# Patient Record
Sex: FEMALE | Race: WHITE | NOT HISPANIC OR LATINO | Employment: OTHER | URBAN - NONMETROPOLITAN AREA
[De-identification: names, ages, dates, MRNs, and addresses within clinical notes are randomized per-mention and may not be internally consistent; named-entity substitution may affect disease eponyms.]

---

## 2020-07-17 ENCOUNTER — APPOINTMENT (EMERGENCY)
Dept: RADIOLOGY | Facility: HOSPITAL | Age: 75
End: 2020-07-17
Payer: MEDICARE

## 2020-07-17 ENCOUNTER — HOSPITAL ENCOUNTER (OUTPATIENT)
Facility: HOSPITAL | Age: 75
Setting detail: OBSERVATION
Discharge: HOME | End: 2020-07-18
Attending: EMERGENCY MEDICINE | Admitting: FAMILY MEDICINE
Payer: MEDICARE

## 2020-07-17 DIAGNOSIS — R07.9 CHEST PAIN, UNSPECIFIED TYPE: Primary | ICD-10-CM

## 2020-07-17 PROBLEM — E78.5 HYPERLIPIDEMIA: Status: ACTIVE | Noted: 2020-07-17

## 2020-07-17 PROBLEM — R73.9 HYPERGLYCEMIA: Status: ACTIVE | Noted: 2020-07-17

## 2020-07-17 PROBLEM — D72.829 LEUKOCYTOSIS: Status: ACTIVE | Noted: 2020-07-17

## 2020-07-17 PROBLEM — E03.9 ACQUIRED HYPOTHYROIDISM: Status: ACTIVE | Noted: 2020-07-17

## 2020-07-17 LAB
ALBUMIN SERPL BCP-MCNC: 3.7 G/DL (ref 3.5–5)
ALP SERPL-CCNC: 90 U/L (ref 46–116)
ALT SERPL W P-5'-P-CCNC: 34 U/L (ref 12–78)
ANION GAP SERPL CALCULATED.3IONS-SCNC: 8 MMOL/L (ref 4–13)
AST SERPL W P-5'-P-CCNC: 23 U/L (ref 5–45)
BASOPHILS # BLD AUTO: 0.03 THOUSANDS/ΜL (ref 0–0.1)
BASOPHILS NFR BLD AUTO: 0 % (ref 0–1)
BILIRUB SERPL-MCNC: 0.54 MG/DL (ref 0.2–1)
BUN SERPL-MCNC: 23 MG/DL (ref 5–25)
CALCIUM SERPL-MCNC: 9.6 MG/DL (ref 8.3–10.1)
CHLORIDE SERPL-SCNC: 105 MMOL/L (ref 100–108)
CO2 SERPL-SCNC: 26 MMOL/L (ref 21–32)
CREAT SERPL-MCNC: 0.95 MG/DL (ref 0.6–1.3)
EOSINOPHIL # BLD AUTO: 0.02 THOUSAND/ΜL (ref 0–0.61)
EOSINOPHIL NFR BLD AUTO: 0 % (ref 0–6)
ERYTHROCYTE [DISTWIDTH] IN BLOOD BY AUTOMATED COUNT: 13.9 % (ref 11.6–15.1)
GFR SERPL CREATININE-BSD FRML MDRD: 60 ML/MIN/1.73SQ M
GLUCOSE SERPL-MCNC: 144 MG/DL (ref 65–140)
HCT VFR BLD AUTO: 42.8 % (ref 34.8–46.1)
HGB BLD-MCNC: 13.9 G/DL (ref 11.5–15.4)
IMM GRANULOCYTES # BLD AUTO: 0.14 THOUSAND/UL (ref 0–0.2)
IMM GRANULOCYTES NFR BLD AUTO: 1 % (ref 0–2)
LYMPHOCYTES # BLD AUTO: 1.77 THOUSANDS/ΜL (ref 0.6–4.47)
LYMPHOCYTES NFR BLD AUTO: 12 % (ref 14–44)
MCH RBC QN AUTO: 29.5 PG (ref 26.8–34.3)
MCHC RBC AUTO-ENTMCNC: 32.5 G/DL (ref 31.4–37.4)
MCV RBC AUTO: 91 FL (ref 82–98)
MONOCYTES # BLD AUTO: 0.83 THOUSAND/ΜL (ref 0.17–1.22)
MONOCYTES NFR BLD AUTO: 5 % (ref 4–12)
NEUTROPHILS # BLD AUTO: 12.54 THOUSANDS/ΜL (ref 1.85–7.62)
NEUTS SEG NFR BLD AUTO: 82 % (ref 43–75)
NRBC BLD AUTO-RTO: 0 /100 WBCS
NT-PROBNP SERPL-MCNC: 73 PG/ML
PLATELET # BLD AUTO: 249 THOUSANDS/UL (ref 149–390)
PMV BLD AUTO: 10.2 FL (ref 8.9–12.7)
POTASSIUM SERPL-SCNC: 3.9 MMOL/L (ref 3.5–5.3)
PROT SERPL-MCNC: 7.2 G/DL (ref 6.4–8.2)
RBC # BLD AUTO: 4.71 MILLION/UL (ref 3.81–5.12)
SODIUM SERPL-SCNC: 139 MMOL/L (ref 136–145)
TROPONIN I SERPL-MCNC: 0.03 NG/ML
TROPONIN I SERPL-MCNC: <0.02 NG/ML
WBC # BLD AUTO: 15.33 THOUSAND/UL (ref 4.31–10.16)

## 2020-07-17 PROCEDURE — 99285 EMERGENCY DEPT VISIT HI MDM: CPT

## 2020-07-17 PROCEDURE — 83880 ASSAY OF NATRIURETIC PEPTIDE: CPT | Performed by: EMERGENCY MEDICINE

## 2020-07-17 PROCEDURE — 99220 PR INITIAL OBSERVATION CARE/DAY 70 MINUTES: CPT | Performed by: NURSE PRACTITIONER

## 2020-07-17 PROCEDURE — 80053 COMPREHEN METABOLIC PANEL: CPT | Performed by: EMERGENCY MEDICINE

## 2020-07-17 PROCEDURE — 36415 COLL VENOUS BLD VENIPUNCTURE: CPT | Performed by: EMERGENCY MEDICINE

## 2020-07-17 PROCEDURE — 84484 ASSAY OF TROPONIN QUANT: CPT | Performed by: EMERGENCY MEDICINE

## 2020-07-17 PROCEDURE — 99285 EMERGENCY DEPT VISIT HI MDM: CPT | Performed by: EMERGENCY MEDICINE

## 2020-07-17 PROCEDURE — 84484 ASSAY OF TROPONIN QUANT: CPT | Performed by: NURSE PRACTITIONER

## 2020-07-17 PROCEDURE — 85025 COMPLETE CBC W/AUTO DIFF WBC: CPT | Performed by: EMERGENCY MEDICINE

## 2020-07-17 PROCEDURE — 93005 ELECTROCARDIOGRAM TRACING: CPT

## 2020-07-17 PROCEDURE — 71045 X-RAY EXAM CHEST 1 VIEW: CPT

## 2020-07-17 RX ORDER — MINOXIDIL 2 %
SOLUTION, NON-ORAL TOPICAL
COMMUNITY
Start: 2017-10-10

## 2020-07-17 RX ORDER — SIMVASTATIN 40 MG
TABLET ORAL
COMMUNITY
Start: 2011-12-28

## 2020-07-17 RX ORDER — LEVOTHYROXINE SODIUM 0.07 MG/1
75 TABLET ORAL
Status: DISCONTINUED | OUTPATIENT
Start: 2020-07-18 | End: 2020-07-18 | Stop reason: HOSPADM

## 2020-07-17 RX ORDER — CHOLECALCIFEROL (VITAMIN D3) 125 MCG
CAPSULE ORAL EVERY OTHER DAY
COMMUNITY

## 2020-07-17 RX ORDER — UBIDECARENONE 200 MG
CAPSULE ORAL DAILY
COMMUNITY
Start: 2019-11-15

## 2020-07-17 RX ORDER — ALPRAZOLAM 0.25 MG/1
0.25 TABLET ORAL
Status: DISCONTINUED | OUTPATIENT
Start: 2020-07-17 | End: 2020-07-18 | Stop reason: HOSPADM

## 2020-07-17 RX ORDER — ACETAMINOPHEN 325 MG/1
650 TABLET ORAL EVERY 4 HOURS PRN
Status: DISCONTINUED | OUTPATIENT
Start: 2020-07-17 | End: 2020-07-18 | Stop reason: HOSPADM

## 2020-07-17 RX ORDER — ASPIRIN 81 MG/1
81 TABLET ORAL DAILY
Status: DISCONTINUED | OUTPATIENT
Start: 2020-07-18 | End: 2020-07-18 | Stop reason: HOSPADM

## 2020-07-17 RX ORDER — PRAVASTATIN SODIUM 80 MG/1
80 TABLET ORAL
Status: DISCONTINUED | OUTPATIENT
Start: 2020-07-18 | End: 2020-07-18 | Stop reason: HOSPADM

## 2020-07-17 RX ORDER — LEVOTHYROXINE SODIUM 0.07 MG/1
TABLET ORAL
COMMUNITY
Start: 2011-12-28

## 2020-07-17 RX ORDER — DOXYCYCLINE HYCLATE 20 MG
20 TABLET ORAL 2 TIMES DAILY
COMMUNITY
Start: 2020-01-07 | End: 2020-07-18 | Stop reason: HOSPADM

## 2020-07-17 RX ORDER — ALPRAZOLAM 0.25 MG/1
TABLET ORAL
COMMUNITY
Start: 2011-12-28

## 2020-07-17 NOTE — ED PROVIDER NOTES
History  Chief Complaint   Patient presents with    Chest Pain     midsternal chest pain that started 1 hour prior to arrival with nausea and HA   denies radiation of pain   given 324 ASA and 3 SL nitro PH with relief of pain  Patient complains of onset of crushing substernal chest pain approximately 45 minutes prior to ER arrival   She had no significant radiation of pain and no significant nausea or vomiting  She reported headache  EMS was called to her home, they provided the patient with 324 mg of aspirin and 3 sublingual nitroglycerin which improved her pain from 10/10 to 3/10 upon her arrival here  She continues to deny shortness of breath or nausea or vomiting  No other complaints at this time  Onset of pain was at rest       History provided by:  Patient   used: No    Chest Pain   Pain location:  Substernal area  Pain quality: crushing    Pain radiates to:  Does not radiate  Pain radiates to the back: no    Pain severity:  Severe  Onset quality:  Gradual  Duration:  45 minutes  Timing:  Constant  Progression:  Improving  Chronicity:  New  Relieved by:  Nitroglycerin and aspirin  Worsened by:  Nothing tried  Ineffective treatments:  None tried  Associated symptoms: no abdominal pain, no back pain, no claudication, no cough, no diaphoresis, no dizziness, no dysphagia, no fever, no headache, no nausea, no near-syncope, no numbness, no palpitations, no PND, no shortness of breath, no syncope and not vomiting        Prior to Admission Medications   Prescriptions Last Dose Informant Patient Reported? Taking?    ALPRAZolam (XANAX) 0 25 mg tablet   Yes Yes   Sig: Take by mouth   Cholecalciferol (VITAMIN D3) 50 MCG (2000 UT) TABS   Yes No   Sig: Take by mouth   Coenzyme Q10 200 MG capsule   Yes Yes   Sig: Take by mouth   Cyanocobalamin 1000 MCG SUBL   Yes Yes   Sig: Place 1,000 mcg under the tongue   Multiple Vitamin (MULTI-VITAMIN) tablet   Yes No   Sig: Take by mouth   Omega-3 Fatty Acids ([employer] FISH OIL) 1200 MG CAPS   Yes Yes   Sig: Take by mouth   Turmeric Curcumin 500 MG CAPS   Yes No   Sig: Take by mouth   doxycycline (PERIOSTAT) 20 MG tablet   Yes Yes   Sig: Take 20 mg by mouth   levothyroxine (Synthroid) 75 mcg tablet   Yes Yes   Sig: Take by mouth   simvastatin (ZOCOR) 40 mg tablet   Yes Yes   Sig: Take by mouth      Facility-Administered Medications: None       History reviewed  No pertinent past medical history  History reviewed  No pertinent surgical history  History reviewed  No pertinent family history  I have reviewed and agree with the history as documented  E-Cigarette/Vaping    E-Cigarette Use Never User      E-Cigarette/Vaping Substances     Social History     Tobacco Use    Smoking status: Never Smoker    Smokeless tobacco: Never Used   Substance Use Topics    Alcohol use: Yes     Frequency: 4 or more times a week    Drug use: Not Currently       Review of Systems   Constitutional: Negative for chills, diaphoresis and fever  HENT: Negative for ear pain, hearing loss, sore throat, trouble swallowing and voice change  Eyes: Negative for pain and discharge  Respiratory: Negative for cough, shortness of breath and wheezing  Cardiovascular: Positive for chest pain  Negative for palpitations, claudication, syncope, PND and near-syncope  Gastrointestinal: Negative for abdominal pain, blood in stool, constipation, diarrhea, nausea and vomiting  Genitourinary: Negative for dysuria, flank pain, frequency and hematuria  Musculoskeletal: Negative for back pain, joint swelling, neck pain and neck stiffness  Skin: Negative for rash and wound  Neurological: Negative for dizziness, seizures, syncope, facial asymmetry, numbness and headaches  Psychiatric/Behavioral: Negative for hallucinations, self-injury and suicidal ideas  All other systems reviewed and are negative        Physical Exam  Physical Exam   Constitutional: She is oriented to person, place, and time  She appears well-developed and well-nourished  No distress  HENT:   Head: Normocephalic and atraumatic  Right Ear: External ear normal    Left Ear: External ear normal    Eyes: Pupils are equal, round, and reactive to light  Conjunctivae and EOM are normal    Neck: Normal range of motion  Neck supple  Cardiovascular: Normal rate, regular rhythm and normal heart sounds  No murmur heard  Pulmonary/Chest: Effort normal and breath sounds normal  She has no wheezes  She has no rales  Abdominal: Soft  Bowel sounds are normal  She exhibits no distension  There is no tenderness  There is no rebound and no guarding  Musculoskeletal: Normal range of motion  She exhibits no deformity  Neurological: She is alert and oriented to person, place, and time  No cranial nerve deficit  Skin: Skin is warm and dry  No rash noted  Psychiatric: She has a normal mood and affect  Her behavior is normal    Nursing note and vitals reviewed        Vital Signs  ED Triage Vitals   Temperature Pulse Respirations Blood Pressure SpO2   07/17/20 1942 07/17/20 1942 07/17/20 1942 07/17/20 1942 07/17/20 1942   97 8 °F (36 6 °C) 89 15 128/70 95 %      Temp Source Heart Rate Source Patient Position - Orthostatic VS BP Location FiO2 (%)   07/17/20 1942 07/17/20 2000 07/17/20 1942 07/17/20 1942 --   Temporal Monitor Lying Right arm       Pain Score       07/17/20 1942       No Pain           Vitals:    07/17/20 1942 07/17/20 2000   BP: 128/70 125/77   Pulse: 89 67   Patient Position - Orthostatic VS: Lying Sitting         Visual Acuity      ED Medications  Medications - No data to display    Diagnostic Studies  Results Reviewed     Procedure Component Value Units Date/Time    NT-BNP PRO [[id number]]  (Normal) Collected:  07/17/20 1946    Lab Status:  Final result Specimen:  Blood from Arm, Left Updated:  07/17/20 2020     NT-proBNP 73 pg/mL     Comprehensive metabolic panel [[id number]]  (Abnormal) Collected:  07/17/20 1946    Lab Status:  Final result Specimen:  Blood from Arm, Left Updated:  07/17/20 2017     Sodium 139 mmol/L      Potassium 3 9 mmol/L      Chloride 105 mmol/L      CO2 26 mmol/L      ANION GAP 8 mmol/L      BUN 23 mg/dL      Creatinine 0 95 mg/dL      Glucose 144 mg/dL      Calcium 9 6 mg/dL      AST 23 U/L      ALT 34 U/L      Alkaline Phosphatase 90 U/L      Total Protein 7 2 g/dL      Albumin 3 7 g/dL      Total Bilirubin 0 54 mg/dL      eGFR 60 ml/min/1 73sq m     Narrative:       National Kidney Disease Foundation guidelines for Chronic Kidney Disease (CKD):     Stage 1 with normal or high GFR (GFR > 90 mL/min/1 73 square meters)    Stage 2 Mild CKD (GFR = 60-89 mL/min/1 73 square meters)    Stage 3A Moderate CKD (GFR = 45-59 mL/min/1 73 square meters)    Stage 3B Moderate CKD (GFR = 30-44 mL/min/1 73 square meters)    Stage 4 Severe CKD (GFR = 15-29 mL/min/1 73 square meters)    Stage 5 End Stage CKD (GFR <15 mL/min/1 73 square meters)  Note: GFR calculation is accurate only with a steady state creatinine    Troponin I [[id number]]  (Normal) Collected:  07/17/20 1946    Lab Status:  Final result Specimen:  Blood from Arm, Left Updated:  07/17/20 2017     Troponin I <0 02 ng/mL     CBC and differential [[id number]]  (Abnormal) Collected:  07/17/20 1946    Lab Status:  Final result Specimen:  Blood from Arm, Left Updated:  07/17/20 1953     WBC 15 33 Thousand/uL      RBC 4 71 Million/uL      Hemoglobin 13 9 g/dL      Hematocrit 42 8 %      MCV 91 fL      MCH 29 5 pg      MCHC 32 5 g/dL      RDW 13 9 %      MPV 10 2 fL      Platelets 718 Thousands/uL      nRBC 0 /100 WBCs      Neutrophils Relative 82 %      Immat GRANS % 1 %      Lymphocytes Relative 12 %      Monocytes Relative 5 %      Eosinophils Relative 0 %      Basophils Relative 0 %      Neutrophils Absolute 12 54 Thousands/µL      Immature Grans Absolute 0 14 Thousand/uL      Lymphocytes Absolute 1 77 Thousands/µL      Monocytes Absolute 0 83 Thousand/µL Eosinophils Absolute 0 02 Thousand/µL      Basophils Absolute 0 03 Thousands/µL                  XR chest portable   ED Interpretation by Cody Torres MD (07/17 2041)   No acute disease                 Procedures  ECG 12 Lead Documentation Only  Date/Time: 7/17/2020 7:38 PM  Performed by: Cody Torres MD  Authorized by: Cody Torres MD     Indications / Diagnosis:  Cp  ECG reviewed by me, the ED Provider: yes    Patient location:  ED  Previous ECG:     Previous ECG:  Unavailable  Interpretation:     Interpretation: abnormal    Rate:     ECG rate:  82    ECG rate assessment: normal    Rhythm:     Rhythm: sinus rhythm    Ectopy:     Ectopy: none    QRS:     QRS axis:  Normal    QRS intervals: Wide  Conduction:     Conduction: abnormal      Abnormal conduction: complete RBBB    ST segments:     ST segments:  Normal  T waves:     T waves: normal               ED Course       US AUDIT      Most Recent Value   Initial Alcohol Screen: US AUDIT-C    1  How often do you have a drink containing alcohol?  0 Filed at: 07/17/2020 1946   2  How many drinks containing alcohol do you have on a typical day you are drinking? 0 Filed at: 07/17/2020 1946   3a  Male UNDER 65: How often do you have five or more drinks on one occasion? 0 Filed at: 07/17/2020 1946   3b  FEMALE Any Age, or MALE 65+: How often do you have 4 or more drinks on one occassion? 0 Filed at: 07/17/2020 1946   Audit-C Score  0 Filed at: 07/17/2020 1946            HEART Risk Score      Most Recent Value   Heart Score Risk Calculator   History  2 Filed at: 07/17/2020 2020   ECG  1 Filed at: 07/17/2020 2020   Age  2 Filed at: 07/17/2020 2020   Risk Factors  1 Filed at: 07/17/2020 2020   Troponin  0 Filed at: 07/17/2020 2020   HEART Score  6 Filed at: 07/17/2020 2020            SUNNY/DAST-10      Most Recent Value   How many times in the past year have you    Used an illegal drug or used a prescription medication for non-medical reasons?   Never Filed at: 07/17/2020 1946                                [facility]  Number of Diagnoses or Management Options     Amount and/or Complexity of Data Reviewed  Clinical lab tests: ordered and reviewed  Tests in the radiology section of CPT®: ordered and reviewed  Decide to obtain previous medical records or to obtain history from someone other than the patient: yes  Review and summarize past medical records: yes  Independent visualization of images, tracings, or specimens: yes          Disposition  Final diagnoses:   Chest pain, unspecified type     Time reflects when diagnosis was documented in both MDM as applicable and the Disposition within this note     Time User Action Codes Description Comment    7/17/2020  8:39 PM Lg Shown Add [R07 9] Chest pain, unspecified type       ED Disposition     ED Disposition Condition Date/Time Comment    Admit Stable Fri Jul 17, 2020  8:39 PM Case was discussed with Bola Harvey and the patient's admission status was agreed to be Admission Status: observation status to the service of Dr David Kim   Follow-up Information    None         Patient's Medications   Discharge Prescriptions    No medications on file     No discharge procedures on file      PDMP Review     None          ED Provider  Electronically Signed by           Renee Rios MD  07/17/20 2730

## 2020-07-18 VITALS
RESPIRATION RATE: 19 BRPM | WEIGHT: 204.59 LBS | HEART RATE: 70 BPM | TEMPERATURE: 99.8 F | SYSTOLIC BLOOD PRESSURE: 122 MMHG | OXYGEN SATURATION: 95 % | DIASTOLIC BLOOD PRESSURE: 69 MMHG

## 2020-07-18 LAB
CHOLEST SERPL-MCNC: 139 MG/DL (ref 50–200)
HDLC SERPL-MCNC: 54 MG/DL
LDLC SERPL CALC-MCNC: 58 MG/DL (ref 0–100)
TRIGL SERPL-MCNC: 135 MG/DL
TROPONIN I SERPL-MCNC: 0.02 NG/ML

## 2020-07-18 PROCEDURE — 84484 ASSAY OF TROPONIN QUANT: CPT | Performed by: NURSE PRACTITIONER

## 2020-07-18 PROCEDURE — 80061 LIPID PANEL: CPT | Performed by: NURSE PRACTITIONER

## 2020-07-18 PROCEDURE — 99217 PR OBSERVATION CARE DISCHARGE MANAGEMENT: CPT | Performed by: INTERNAL MEDICINE

## 2020-07-18 RX ADMIN — ASPIRIN 81 MG: 81 TABLET, COATED ORAL at 08:38

## 2020-07-18 RX ADMIN — LEVOTHYROXINE SODIUM 75 MCG: 75 TABLET ORAL at 05:17

## 2020-07-18 NOTE — ASSESSMENT & PLAN NOTE
· Glucose 144  · Last H A1c 2/2020:  6 0  · Has outpatient blood work every 6 months  · Continue outpatient follow-up

## 2020-07-18 NOTE — ED NOTES
Patient saying goodbye to family  Patient reports no chest pain, reviewed transport with PCA        Geeta Rogers RN  07/17/20 2052

## 2020-07-18 NOTE — H&P
[facility] Internal Medicine     H&P- [address] [facility] 1945, 70 y o  female MRN: [id number]    Unit/Bed#: -Jason Encounter: [id number]    Primary Care Provider: No primary care provider on file  Date and time admitted to hospital: 7/17/2020  7:37 PM        * Chest pain in adult  Assessment & Plan  · Sudden onset of severe chest pain associated with nausea and headache which resolved with 3 nitroglycerin  · CASH score:  2  · EKG:  NSR rate of 82 with no evidence of acute infarct  · PCXR:  No acute cardiopulmonary disease  · Troponin: < 0 02  · Patient says she had stress test about 2 years ago in [place]  Per record review, had stress echo in 2014, not able to find additional stress testing in available records  · Serial troponins and EKGs  · Aspirin  · Continue statin  · Cardiac diet  · Consider inpatient stress testing    Leukocytosis  Assessment & Plan  · WBC:  15 33  · Could be reactive  · Afebrile  · Recheck CBC and trend WBC    Hyperglycemia  Assessment & Plan  · Glucose 144  · Last H A1c 2/2020:  6 0  · Has outpatient blood work every 6 months  · Continue outpatient follow-up    Hyperlipidemia  Assessment & Plan  · Check lipid panel  · Continue statin  · Outpatient follow-up    Acquired hypothyroidism  Assessment & Plan  · Last TSH 2/2020: 0 95   · Continue levothyroxine  · Outpatient follow-up      VTE Prophylaxis: Enoxaparin (Lovenox)  / reason for no mechanical VTE prophylaxis VTE score 3   Code Status:  Full  POLST: POLST form is not discussed and not completed at this time  Discussion with family:  Patient    Anticipated Length of Stay:  Patient will be admitted on an Observation basis with an anticipated length of stay of  less than 2 midnights  Justification for Hospital Stay:  Per plan above    Total Time for Visit, including Counseling / Coordination of Care: 30 minutes    Greater than 50% of this total time spent on direct patient counseling and coordination of care     Chief Complaint:   Chest pain    History of Present Illness:    Lenin Cid is a 70 y o  female with history of acquired hypothyroidism, hyperlipidemia, and hyperglycemia who presents with non-radiating chest pain  The pain started suddenly and was described as severe and crushing  She also had a headache and nausea  She received 3 nitroglycerin pre-hospital which resolved her symptoms  She denies fever or chills, cough, palpitations, abdominal pain, arthralgias or myalgias, dizziness, syncope, or focal weakness  Review of Systems:    Review of Systems   Constitutional: Negative for chills and fever  Respiratory: Negative for cough and shortness of breath  Cardiovascular: Positive for chest pain  Negative for palpitations and leg swelling  Gastrointestinal: Positive for nausea  Negative for abdominal distention, abdominal pain, constipation, diarrhea and vomiting  Genitourinary: Negative for dysuria and frequency  Musculoskeletal: Negative for arthralgias and myalgias  Neurological: Positive for headaches  Negative for dizziness, syncope and weakness  All other systems reviewed and are negative  Past Medical and Surgical History:     Past Medical History:   Diagnosis Date    Anxiety     Disease of thyroid gland     Hypercholesterolemia     Rosacea        Past Surgical History:   Procedure Laterality Date    HYSTERECTOMY         Meds/Allergies:    Prior to Admission medications    Medication Sig Start Date End Date Taking?  Authorizing Provider   ALPRAZolam Jackson Glance) 0 25 mg tablet Take by mouth 12/28/11  Yes Historical Provider, MD   Coenzyme Q10 200 MG capsule Take by mouth 11/15/19  Yes Historical Provider, MD   Cyanocobalamin 1000 MCG SUBL Place 1,000 mcg under the tongue 11/15/19  Yes Historical Provider, MD   doxycycline (PERIOSTAT) 20 MG tablet Take 20 mg by mouth 1/7/20  Yes Historical Provider, MD   levothyroxine (Synthroid) 75 mcg tablet Take by mouth 12/28/11 Yes Historical Provider, MD   Omega-3 Fatty Acids ([employer] FISH OIL) 1200 MG CAPS Take by mouth 10/10/17  Yes Historical Provider, MD   simvastatin (ZOCOR) 40 mg tablet Take by mouth 12/28/11  Yes Historical Provider, MD   Cholecalciferol (VITAMIN D3) 50 MCG (2000 UT) TABS Take by mouth    Historical Provider, MD   Multiple Vitamin (MULTI-VITAMIN) tablet Take by mouth    Historical Provider, MD   Turmeric Curcumin 500 MG CAPS Take by mouth    Historical Provider, MD     I have reviewed home medications with patient personally  Allergies: Allergies   Allergen Reactions    Penicillins        Social History:     Marital Status: /Civil Union   Occupation:  Retired teacher  Patient Pre-hospital Living Situation:  Home  Patient Pre-hospital Level of Mobility:  Independent  Patient Pre-hospital Diet Restrictions:  None  Substance Use History:   Social History     Substance and Sexual Activity   Alcohol Use Yes    Frequency: 4 or more times a week    Drinks per session: 1 or 2    Binge frequency: Never     Social History     Tobacco Use   Smoking Status Never Smoker   Smokeless Tobacco Never Used     Social History     Substance and Sexual Activity   Drug Use Not Currently       Family History:    Family History   Problem Relation Age of Onset    Heart disease Mother     Heart disease Father        Physical Exam:     Vitals:   Blood Pressure: 129/78 (07/17/20 2122)  Pulse: 60 (07/17/20 2057)  Temperature: 98 1 °F (36 7 °C) (07/17/20 2122)  Temp Source: Temporal (07/17/20 2057)  Respirations: 20 (07/17/20 2122)  Weight - Scale: 92 8 kg (204 lb 9 4 oz) (07/17/20 1948)  SpO2: 95 % (07/17/20 2057)    Physical Exam   Constitutional: She is oriented to person, place, and time  She appears well-developed and well-nourished  HENT:   Head: Normocephalic and atraumatic  Mouth/Throat: Oropharynx is clear and moist    Eyes: Pupils are equal, round, and reactive to light   EOM are normal    Neck: Normal range of motion  Neck supple  Cardiovascular: Normal rate, regular rhythm, normal heart sounds and intact distal pulses  Exam reveals no gallop and no friction rub  No murmur heard  Pulmonary/Chest: Effort normal and breath sounds normal  No respiratory distress  Abdominal: Soft  Bowel sounds are normal  She exhibits no distension and no mass  There is no tenderness  There is no guarding  Musculoskeletal: Normal range of motion  She exhibits no edema, tenderness or deformity  Neurological: She is alert and oriented to person, place, and time  Skin: Skin is warm and dry  Capillary refill takes less than 2 seconds  Nursing note and vitals reviewed  Additional Data:     Lab Results: I have personally reviewed pertinent reports  Results from last 7 days   Lab Units 07/17/20 1946   WBC Thousand/uL 15 33*   HEMOGLOBIN g/dL 13 9   HEMATOCRIT % 42 8   PLATELETS Thousands/uL 249   NEUTROS PCT % 82*   LYMPHS PCT % 12*   MONOS PCT % 5   EOS PCT % 0     Results from last 7 days   Lab Units 07/17/20 1946   SODIUM mmol/L 139   POTASSIUM mmol/L 3 9   CHLORIDE mmol/L 105   CO2 mmol/L 26   BUN mg/dL 23   CREATININE mg/dL 0 95   ANION GAP mmol/L 8   CALCIUM mg/dL 9 6   ALBUMIN g/dL 3 7   TOTAL BILIRUBIN mg/dL 0 54   ALK PHOS U/L 90   ALT U/L 34   AST U/L 23   GLUCOSE RANDOM mg/dL 144*                       Imaging: I have personally reviewed pertinent reports  XR chest portable   ED Interpretation by Gomez Pleitez MD (07/17 2041)   No acute disease            Allscripts / Epic Records Reviewed: Yes     ** Please Note: This note has been constructed using a voice recognition system   **

## 2020-07-18 NOTE — UTILIZATION REVIEW
Initial Clinical Review    Admission: Date/Time/Statement: Admission Orders (From admission, onward)     Ordered        07/17/20 2040  Place in Observation  Once                   Orders Placed This Encounter   Procedures    Place in Observation     Standing Status:   Standing     Number of Occurrences:   1     Order Specific Question:   Admitting Physician     Answer:   Kristina Hurley [[id number]]     Order Specific Question:   Level of Care     Answer:   Med Surg [16]     ED Arrival Information     Expected Arrival Acuity Means of Arrival Escorted By Service Admission Type    - 7/17/2020 19:37 Emergent Ambulance Unknown Hospitalist Emergency    Arrival Complaint    chest pain        Chief Complaint   Patient presents with    Chest Pain     midsternal chest pain that started 1 hour prior to arrival with nausea and HA   denies radiation of pain   given 324 ASA and 3 SL nitro PH with relief of pain  Assessment/Plan: Chest pain in adult  Assessment & Plan  · Sudden onset of severe chest pain associated with nausea and headache which resolved with 3 nitroglycerin  · CASH score:  2  · EKG:  NSR rate of 82 with no evidence of acute infarct  · PCXR:  No acute cardiopulmonary disease  · Troponin: < 0 02  · Patient says she had stress test about 2 years ago in [place]    Per record review, had stress echo in 2014, not able to find additional stress testing in available records  · Serial troponins and EKGs  · Aspirin  · Continue statin  · Cardiac diet  · Consider inpatient stress testing     Leukocytosis  Assessment & Plan  · WBC:  15 33  · Could be reactive  · Afebrile  · Recheck CBC and trend WBC       Hyperglycemia  Assessment & Plan  · Glucose 144  · Last H A1c 2/2020:  6 0  · Has outpatient blood work every 6 months  · Continue outpatient follow-up     Hyperlipidemia  Assessment & Plan  · Check lipid panel  · Continue statin  · Outpatient follow-up     Acquired hypothyroidism  Assessment & Plan  · Last TSH 2/2020: 0 95   · Continue levothyroxine  · Outpatient follow-up        VTE Prophylaxis: Enoxaparin (Lovenox)  / reason for no mechanical VTE prophylaxis VTE score 3   Code Status:  Full  POLST: POLST form is not discussed and not completed at this time  Discussion with family:  Patient     Anticipated Length of Stay:  Patient will be admitted on an Observation basis with an anticipated length of stay of  less than 2 midnights  Justification for Hospital Stay:  Per plan above      Hina Joe is a 70 y o  female with history of acquired hypothyroidism, hyperlipidemia, and hyperglycemia who presents with non-radiating chest pain  The pain started suddenly and was described as severe and crushing  She also had a headache and nausea  She received 3 nitroglycerin pre-hospital which resolved her symptoms    She denies fever or chills, cough, palpitations, abdominal pain, arthralgias or myalgias, dizziness, syncope, or focal weakness          ED Triage Vitals   Temperature Pulse Respirations Blood Pressure SpO2   07/17/20 1942 07/17/20 1942 07/17/20 1942 07/17/20 1942 07/17/20 1942   97 8 °F (36 6 °C) 89 15 128/70 95 %      Temp Source Heart Rate Source Patient Position - Orthostatic VS BP Location FiO2 (%)   07/17/20 1942 07/17/20 2000 07/17/20 1942 07/17/20 1942 --   Temporal Monitor Lying Right arm       Pain Score       07/17/20 1942       No Pain        Wt Readings from Last 1 Encounters:   07/17/20 92 8 kg (204 lb 9 4 oz)     Additional Vital Signs:   07/18/20 07:56:36  99 8 °F (37 7 °C)  70  19  122/69  87  95 %       07/18/20 0330  98 3 °F (36 8 °C)  65  19  108/63  78  96 %  None (Room air)  Lying   07/17/20 2209              None (Room air)     07/17/20 21:22:03  98 1 °F (36 7 °C)    20  129/78  95         07/17/20 21:19:35  98 1 °F (36 7 °C)    20  129/78  95         07/17/20 2057    60  21  136/77    95 %  None (Room air)  Lying   07/17/20 2000  67  18  125/77  97  97 % None (Room air)  Sitting   07/17/20 1942  97 8 °F (36 6 °C)  89  15  128/70    95 %  None (Room air)  Lying         Pertinent Labs/Diagnostic Test Results:   EKG    NSR    Complete  RBBB  Wide  QRS      Results from last 7 days   Lab Units 07/17/20 1946   WBC Thousand/uL 15 33*   HEMOGLOBIN g/dL 13 9   HEMATOCRIT % 42 8   PLATELETS Thousands/uL 249   NEUTROS ABS Thousands/µL 12 54*         Results from last 7 days   Lab Units 07/17/20 1946   SODIUM mmol/L 139   POTASSIUM mmol/L 3 9   CHLORIDE mmol/L 105   CO2 mmol/L 26   ANION GAP mmol/L 8   BUN mg/dL 23   CREATININE mg/dL 0 95   EGFR ml/min/1 73sq m 60   CALCIUM mg/dL 9 6     Results from last 7 days   Lab Units 07/17/20 1946   AST U/L 23   ALT U/L 34   ALK PHOS U/L 90   TOTAL PROTEIN g/dL 7 2   ALBUMIN g/dL 3 7   TOTAL BILIRUBIN mg/dL 0 54         Results from last 7 days   Lab Units 07/17/20 1946   GLUCOSE RANDOM mg/dL 144*           Results from last 7 days   Lab Units 07/18/20  0158 07/17/20  2307 07/17/20 1946   TROPONIN I ng/mL 0 02 0 03 <0 02           Results from last 7 days   Lab Units 07/17/20 1946   NT-PRO BNP pg/mL 73           Past Medical History:   Diagnosis Date    Anxiety     Disease of thyroid gland     Hypercholesterolemia     Rosacea      Admitting Diagnosis: Chest pain [R07 9]  Chest pain, unspecified type [R07 9]  Age/Sex: 70 y o  female  Admission Orders:  Scheduled Medications:    Medications:  aspirin 81 mg Oral Daily   enoxaparin 40 mg Subcutaneous Daily   levothyroxine 75 mcg Oral Early Morning   pravastatin 80 mg Oral Daily With Dinner     Continuous IV Infusions:     PRN Meds:    acetaminophen 650 mg Oral Q4H PRN   ALPRAZolam 0 25 mg Oral HS PRN       None    Network Utilization Review Department  [email or web address]  ATTENTION: Please call with any questions or concerns to [phone] and carefully listen to the prompts so that you are directed to the right person   All voicemails are confidential   [facility] all requests for admission clinical reviews, approved or denied determinations and any other requests to dedicated fax number below belonging to the campus where the patient is receiving treatment   List of dedicated fax numbers for the Facilities:  [address] [place] [address] DENIALS (Administrative/Medical Necessity) [phone]   [address]  [address] (Maternity/NICU/Pediatrics) [phone]   Buffy Vickers [phone]   Rose Wren [phone]   Cullen Opitz [phone]   [address] [place] [address]  [phone]   [address] [phone]   [facility]  [phone]   [address] [facility] [address] [facility]  [address] [facility] [address] [facility] [phone]

## 2020-07-18 NOTE — ASSESSMENT & PLAN NOTE
· Chest pain, CASH score 2  · EKG and troponin nonischemic  · Outpatient echo and stress test arranged  · Patient on doxycycline for rosea, advised to hold it  · May consider EGD after stress test  · Patient advised to follow-up with her primary care physician within 3-5 days post discharge  · Patient advised to return to ER for worsening chest pain

## 2020-07-18 NOTE — ASSESSMENT & PLAN NOTE
· Sudden onset of severe chest pain associated with nausea and headache which resolved with 3 nitroglycerin  · CASH score:  2  · EKG:  NSR rate of 82 with no evidence of acute infarct  · PCXR:  No acute cardiopulmonary disease  · Troponin: < 0 02  · Patient says she had stress test about 2 years ago in [place]    Per record review, had stress echo in 2014, not able to find additional stress testing in available records  · Serial troponins and EKGs  · Aspirin  · Continue statin  · Cardiac diet  · Consider inpatient stress testing

## 2020-07-18 NOTE — NURSING NOTE
After visit summary reviewed with patient  Patient verbalized understanding of same   Scripts give to patient for out patient echo and stress test

## 2020-07-18 NOTE — DISCHARGE SUMMARY
Discharge- [facility] 1945, 70 y o  female MRN: [id number]    Unit/Bed#: -01 Encounter: [id number]    Primary Care Provider: No primary care provider on file  Date and time admitted to hospital: 7/17/2020  7:37 PM    * Chest pain in adult  Assessment & Plan  · Chest pain, CASH score 2  · EKG and troponin nonischemic  · Outpatient echo and stress test arranged  · Patient on doxycycline for rosea, advised to hold it  · May consider EGD after stress test  · Patient advised to follow-up with her primary care physician within 3-5 days post discharge  · Patient advised to return to ER for worsening chest pain    Hyperglycemia  Assessment & Plan  · Glucose 144  · Last H A1c 2/2020:  6 0  · Has outpatient blood work every 6 months  · Continue outpatient follow-up    Hyperlipidemia  Assessment & Plan  · Check lipid panel  · Continue statin  · Outpatient follow-up    Acquired hypothyroidism  Assessment & Plan  · Last TSH 2/2020: 0 95   · Continue levothyroxine  · Outpatient follow-up    Discharging Physician / Practitioner: Danielle Mancilla MD  PCP: No primary care provider on file  Admission Date:   Admission Orders (From admission, onward)     Ordered        07/17/20 2040  Place in Observation  Once                   Discharge Date: 07/18/20    Disposition:      Other: Home    For Discharges to [facility] SNF:   · Not Applicable to this Patient - Not Applicable to this Patient    Reason for Admission:  Chest pain    Discharge Diagnoses:     Please see assessment and plan section above for further details regarding discharge diagnoses       Resolved Problems  Date Reviewed: 7/17/2020    None          Consultations During Hospital Stay:  None     Procedures Performed:   * No surgery found *      Xr Chest Portable    Result Date: 7/18/2020  Narrative: CHEST INDICATION:   cp  COMPARISON:  None EXAM PERFORMED/VIEWS:  XR CHEST PORTABLE  AP semierect FINDINGS: Cardiomediastinal silhouette appears unremarkable  The lungs are clear  No pneumothorax or pleural effusion  Osseous structures appear within normal limits for patient age  Impression: No acute cardiopulmonary disease  Workstation performed: [id number]        Medication Adjustments and Discharge Medications:  · Summary of Medication Adjustments made as a result of this hospitalization:  None  · Medication Dosing Tapers - Please refer to Discharge Medication List for details on any medication dosing tapers (if applicable to patient)  · Discharge Medication List: See after visit summary for reconciled discharge medications  Wound Care Recommendations:  When applicable, please see wound care section of After Visit Summary  Diet Recommendations at Discharge:  Diet -        Diet Orders   (From admission, onward)             Start     Ordered    07/17/20 2200  Diet Cardiovascular; Cardiac; No Chocolate, No Caffeine  Diet effective now     Question Answer Comment   Diet Type Cardiovascular    Cardiac Cardiac    Other Restriction(s): No Chocolate    Other Restriction(s): No Caffeine    RD to adjust diet per protocol? Yes        07/17/20 2159                  Incidental Findings:   · None     Test Results Pending at Discharge (will require follow up): · None         Hospital Course:     Capo Sterling is a 70 y o  female patient who originally presented to the hospital on 7/17/2020 due to chest pain  Admitted for ACS rule out  Troponin EKG non ischemic  Telemetry with no significant finding  EKG repeated before discharge and showed no change  Outpatient echo and stress test arranged  Patient chronically taking doxycycline for rosacea, advised to hold it  Will consider EGD depending on stress test results  Patient advised to follow-up with her primary care physician within 3-5 days post discharge      Condition at Discharge: stable     Discharge Day Visit / Exam:     Subjective:  No complaints  Vitals: Blood Pressure: 122/69 (07/18/20 2838)  Pulse: 70 (07/18/20 0756)  Temperature: 99 8 °F (37 7 °C) (07/18/20 0756)  Temp Source: Oral (07/18/20 0330)  Respirations: 19 (07/18/20 0756)  Weight - Scale: 92 8 kg (204 lb 9 4 oz) (07/17/20 1948)  SpO2: 95 % (07/18/20 0756)  Exam:     General appearance: alert, appears stated age and cooperative  Head: Normocephalic, without obvious abnormality, atraumatic  Lungs: clear to auscultation bilaterally  Heart: regular rate and rhythm  Abdomen: soft, non-tender, positive bowel sounds   Back: negative  Extremities: extremities atraumatic, no cyanosis or edema  Neurologic: Alert and oriented X 3, normal strength and tone  Normal symmetric reflexes  Normal coordination and gait      Discharge instructions/Information to patient and family:   See after visit summary section titled Discharge Instructions for information provided to patient and family  Planned Readmission:  No      Discharge Statement:  I spent 35 minutes discharging the patient  This time was spent on the day of discharge  I had direct contact with the patient on the day of discharge  Greater than 50% of the total time was spent examining patient, answering all patient questions, arranging and discussing plan of care with patient as well as directly providing post-discharge instructions  Additional time then spent on discharge activities      ** Please Note: This note has been constructed using a voice recognition system **

## 2020-07-20 LAB
ATRIAL RATE: 82 BPM
P AXIS: 58 DEGREES
PR INTERVAL: 188 MS
QRS AXIS: 47 DEGREES
QRSD INTERVAL: 128 MS
QT INTERVAL: 404 MS
QTC INTERVAL: 472 MS
T WAVE AXIS: 10 DEGREES
VENTRICULAR RATE: 82 BPM

## 2020-07-20 PROCEDURE — 93010 ELECTROCARDIOGRAM REPORT: CPT | Performed by: INTERNAL MEDICINE

## 2020-07-21 ENCOUNTER — HOSPITAL ENCOUNTER (OUTPATIENT)
Dept: NON INVASIVE DIAGNOSTICS | Facility: HOSPITAL | Age: 75
Discharge: HOME | End: 2020-07-21
Attending: INTERNAL MEDICINE
Payer: MEDICARE

## 2020-07-21 DIAGNOSIS — R07.9 CHEST PAIN, UNSPECIFIED TYPE: ICD-10-CM

## 2020-07-21 LAB
ARRHY DURING EX: NORMAL
CHEST PAIN STATEMENT: NORMAL
MAX DIASTOLIC BP: 78 MMHG
MAX HEART RATE: 127 BPM
MAX PREDICTED HEART RATE: 147 BPM
MAX. SYSTOLIC BP: 146 MMHG
PROTOCOL NAME: NORMAL
REASON FOR TERMINATION: NORMAL
TARGET HR FORMULA: NORMAL
TEST INDICATION: NORMAL
TIME IN EXERCISE PHASE: NORMAL

## 2020-07-21 PROCEDURE — 93308 TTE F-UP OR LMTD: CPT

## 2020-07-21 PROCEDURE — 93321 DOPPLER ECHO F-UP/LMTD STD: CPT | Performed by: INTERNAL MEDICINE

## 2020-07-21 PROCEDURE — 93017 CV STRESS TEST TRACING ONLY: CPT

## 2020-07-21 PROCEDURE — 93325 DOPPLER ECHO COLOR FLOW MAPG: CPT | Performed by: INTERNAL MEDICINE

## 2020-07-21 PROCEDURE — 93308 TTE F-UP OR LMTD: CPT | Performed by: INTERNAL MEDICINE

## 2020-07-21 RX ORDER — LANOLIN ALCOHOL/MO/W.PET/CERES
CREAM (GRAM) TOPICAL DAILY
COMMUNITY

## 2020-07-22 PROCEDURE — 93018 CV STRESS TEST I&R ONLY: CPT

## 2020-07-22 PROCEDURE — 93016 CV STRESS TEST SUPVJ ONLY: CPT

## 2021-04-30 ENCOUNTER — TELEPHONE (OUTPATIENT)
Dept: BARIATRICS | Facility: CLINIC | Age: 76
End: 2021-04-30

## 2023-05-02 ENCOUNTER — HOSPITAL ENCOUNTER (OUTPATIENT)
Dept: RADIOLOGY | Facility: CLINIC | Age: 78
Discharge: HOME | End: 2023-05-02

## 2023-05-02 VITALS — WEIGHT: 200 LBS | BODY MASS INDEX: 37.76 KG/M2 | HEIGHT: 61 IN

## 2023-05-02 DIAGNOSIS — Z12.31 ENCOUNTER FOR SCREENING MAMMOGRAM FOR MALIGNANT NEOPLASM OF BREAST: ICD-10-CM

## 2024-04-02 DIAGNOSIS — Z12.31 ENCOUNTER FOR SCREENING MAMMOGRAM FOR MALIGNANT NEOPLASM OF BREAST: ICD-10-CM

## 2024-05-22 ENCOUNTER — HOSPITAL ENCOUNTER (OUTPATIENT)
Dept: RADIOLOGY | Facility: CLINIC | Age: 79
Discharge: HOME | End: 2024-05-22
Payer: MEDICARE

## 2024-05-22 VITALS — BODY MASS INDEX: 37.76 KG/M2 | WEIGHT: 200 LBS | HEIGHT: 61 IN

## 2024-05-22 DIAGNOSIS — Z12.31 ENCOUNTER FOR SCREENING MAMMOGRAM FOR MALIGNANT NEOPLASM OF BREAST: ICD-10-CM

## 2024-05-22 PROCEDURE — 77067 SCR MAMMO BI INCL CAD: CPT

## 2024-05-22 PROCEDURE — 77063 BREAST TOMOSYNTHESIS BI: CPT

## 2024-05-30 ENCOUNTER — OFFICE VISIT (OUTPATIENT)
Dept: URGENT CARE | Facility: CLINIC | Age: 79
End: 2024-05-30
Payer: MEDICARE

## 2024-05-30 ENCOUNTER — APPOINTMENT (OUTPATIENT)
Dept: RADIOLOGY | Facility: CLINIC | Age: 79
End: 2024-05-30
Payer: MEDICARE

## 2024-05-30 VITALS
WEIGHT: 200 LBS | BODY MASS INDEX: 37.76 KG/M2 | TEMPERATURE: 98.1 F | SYSTOLIC BLOOD PRESSURE: 140 MMHG | HEIGHT: 61 IN | RESPIRATION RATE: 18 BRPM | DIASTOLIC BLOOD PRESSURE: 86 MMHG | OXYGEN SATURATION: 99 % | HEART RATE: 68 BPM

## 2024-05-30 DIAGNOSIS — S81.811A SKIN TEAR OF LOWER LEG WITHOUT COMPLICATION, RIGHT, INITIAL ENCOUNTER: Primary | ICD-10-CM

## 2024-05-30 DIAGNOSIS — S89.91XA INJURY OF RIGHT LOWER LEG, INITIAL ENCOUNTER: ICD-10-CM

## 2024-05-30 PROCEDURE — G0463 HOSPITAL OUTPT CLINIC VISIT: HCPCS

## 2024-05-30 PROCEDURE — 99213 OFFICE O/P EST LOW 20 MIN: CPT

## 2024-05-30 PROCEDURE — 73590 X-RAY EXAM OF LOWER LEG: CPT

## 2024-05-30 RX ORDER — GINSENG 100 MG
1 CAPSULE ORAL 2 TIMES DAILY
Status: DISCONTINUED | OUTPATIENT
Start: 2024-05-30 | End: 2024-05-30

## 2024-05-30 RX ORDER — FLUOXETINE 10 MG/1
10 CAPSULE ORAL DAILY
COMMUNITY
Start: 2024-05-28

## 2024-05-30 RX ORDER — GINSENG 100 MG
1 CAPSULE ORAL ONCE
Status: COMPLETED | OUTPATIENT
Start: 2024-05-30 | End: 2024-05-30

## 2024-05-30 RX ADMIN — Medication 1 LARGE APPLICATION: at 19:40

## 2024-05-30 NOTE — PROGRESS NOTES
"  [facility]        NAME: Hina Gutierrez is a 79 y.o. female  : 1945    MRN: [id number]  DATE: May 30, 2024  TIME: 7:43 PM    Assessment and Plan   Skin tear of lower leg without complication, right, initial encounter [S81.811A]  1. Skin tear of lower leg without complication, right, initial encounter  Ambulatory Referral to Wound Care    bacitracin topical ointment 1 large application    DISCONTINUED: bacitracin topical ointment 1 large application      2. Injury of right lower leg, initial encounter  XR tibia fibula 2 vw right        Discussed problem with patient.  Nonsuturable skin tears.  Wounds were dressed using antibiotic ointment, nonadherent bandages, bulky dressings for pressure.  Also wrapped with gauze and Ace wrap.  Skin flap was approximated as well. advised conservative management for leg edema by keeping legs elevated.  Placing referral for wound care for follow-up as well.    Patient Instructions       Follow up with PCP in 3-5 days.  Proceed to  ER if symptoms worsen.    If tests are performed, our office will contact you with results only if changes need to made to the care plan discussed with you at the visit. [facility]    Chief Complaint     Chief Complaint   Patient presents with   • wound right low leg     Reports going up steps and slipped falling forward \"tearing skin\" on right low leg this evening. Two large  V shaped skin tears noted, weeping sero sanguinous drainage. Patient reports wounds cleansed with soap and water immediately after the incident. Last Tdap           History of Present Illness       Past medical history of superficial vein thrombosis, poor wound healing, prediabetes presents with a lower leg wound that occurred this evening.  Patient states she slipped going up the steps and suffered skin tears. Two large  V shaped skin tears noted, weeping sero sanguinous drainage. Patient reports wounds " cleansed with soap and water immediately after the incident. Last Tdap  2023 after a similar left lower leg wound- had long healing with that as well.         Review of Systems   Review of Systems   Constitutional:  Negative for appetite change, chills, fatigue and fever.   Respiratory:  Negative for cough, shortness of breath, wheezing and stridor.    Cardiovascular:  Negative for chest pain and palpitations.   Skin:  Positive for wound.         Current Medications       Current Outpatient Medications:   •  Cholecalciferol (VITAMIN D3) 50 MCG (2000 UT) TABS, Take by mouth every other day , Disp: , Rfl:   •  Coenzyme Q10 200 MG capsule, Take by mouth daily , Disp: , Rfl:   •  FLUoxetine (PROzac) 10 mg capsule, Take 10 mg by mouth daily, Disp: , Rfl:   •  levothyroxine (Synthroid) 75 mcg tablet, Take by mouth daily in the early morning , Disp: , Rfl:   •  Multiple Vitamin (MULTI-VITAMIN) tablet, Take by mouth, Disp: , Rfl:   •  Omega-3 Fatty Acids ([employer] FISH OIL) 1200 MG CAPS, Take by mouth, Disp: , Rfl:   •  simvastatin (ZOCOR) 40 mg tablet, Take by mouth daily at bedtime , Disp: , Rfl:   •  Turmeric Curcumin 500 MG CAPS, Take by mouth daily , Disp: , Rfl:   •  vitamin B-12 (VITAMIN B-12) 1,000 mcg tablet, Take by mouth daily, Disp: , Rfl:   •  ALPRAZolam (XANAX) 0.25 mg tablet, Take by mouth (Patient not taking: Reported on 5/30/2024), Disp: , Rfl:   No current facility-administered medications for this visit.    Current Allergies     Allergies as of 05/30/2024 - Reviewed 05/30/2024   Allergen Reaction Noted   • Doxycycline Other (See Comments) 07/20/2022   • Penicillins  05/25/2001            The following portions of the patient's history were reviewed and updated as appropriate: allergies, current medications, past family history, past medical history, past social history, past surgical history and problem list.     Past Medical History:   Diagnosis Date   • Anxiety    • Disease of thyroid gland    •  "Hypercholesterolemia    • Rosacea        Past Surgical History:   Procedure Laterality Date   • BACK SURGERY     • HYSTERECTOMY  1999       Family History   Problem Relation Age of Onset   • Heart disease Mother    • Heart disease Father    • No Known Problems Maternal Grandmother    • No Known Problems Maternal Grandfather    • Breast cancer Paternal Grandmother    • No Known Problems Paternal Grandfather    • No Known Problems Brother    • No Known Problems Maternal Aunt    • No Known Problems Maternal Aunt          Medications have been verified.        Objective   /86   Pulse 68   Temp 98.1 °F (36.7 °C)   Resp 18   Ht 5' 1\" (1.549 m)   Wt 90.7 kg (200 lb)   SpO2 99%   BMI 37.79 kg/m²        Physical Exam     Physical Exam  Vitals and nursing note reviewed.   Constitutional:       General: She is not in acute distress.     Appearance: Normal appearance. She is normal weight. She is not ill-appearing, toxic-appearing or diaphoretic.   Cardiovascular:      Rate and Rhythm: Normal rate and regular rhythm.      Pulses: Normal pulses.      Heart sounds: Normal heart sounds. No murmur heard.     No friction rub. No gallop.   Pulmonary:      Effort: Pulmonary effort is normal. No respiratory distress.      Breath sounds: Normal breath sounds. No stridor. No wheezing, rhonchi or rales.   Chest:      Chest wall: No tenderness.   Musculoskeletal:      Right lower leg: Tenderness present. No swelling, deformity, lacerations or bony tenderness. Edema present.      Comments: Generalized tenderness to patient's anterior right lower shin.  Ambulating with limp due to pain   Skin:     Comments: 2 large skin tears to the patient's right lower leg.  No active bleeding but weeping serosanguineous fluid.  No signs of retained foreign bodies.  Does have baseline edema to bilateral legs.   Neurological:      Mental Status: She is alert.                   "

## 2024-06-04 ENCOUNTER — OFFICE VISIT (OUTPATIENT)
Facility: CLINIC | Age: 79
End: 2024-06-04
Payer: MEDICARE

## 2024-06-04 VITALS
HEART RATE: 66 BPM | HEIGHT: 61 IN | BODY MASS INDEX: 37.76 KG/M2 | DIASTOLIC BLOOD PRESSURE: 65 MMHG | RESPIRATION RATE: 18 BRPM | WEIGHT: 200 LBS | TEMPERATURE: 96.8 F | SYSTOLIC BLOOD PRESSURE: 133 MMHG

## 2024-06-04 DIAGNOSIS — S81.801A OPEN WOUND OF RIGHT LOWER EXTREMITY, INITIAL ENCOUNTER: Primary | ICD-10-CM

## 2024-06-04 PROCEDURE — 99213 OFFICE O/P EST LOW 20 MIN: CPT | Performed by: FAMILY MEDICINE

## 2024-06-04 PROCEDURE — 97597 DBRDMT OPN WND 1ST 20 CM/<: CPT | Performed by: FAMILY MEDICINE

## 2024-06-04 RX ORDER — COVID-19 ANTIGEN TEST
220 KIT MISCELLANEOUS 2 TIMES DAILY
COMMUNITY

## 2024-06-04 RX ORDER — LIDOCAINE 40 MG/G
CREAM TOPICAL ONCE
Status: COMPLETED | OUTPATIENT
Start: 2024-06-04 | End: 2024-06-04

## 2024-06-04 RX ORDER — AZELAIC ACID 0.15 G/G
1 GEL TOPICAL DAILY
COMMUNITY

## 2024-06-04 RX ADMIN — LIDOCAINE: 40 CREAM TOPICAL at 10:48

## 2024-06-04 NOTE — PATIENT INSTRUCTIONS
Orders Placed This Encounter   Procedures    Wound cleansing and dressings Traumatic Right Pretibial     Wound location Right leg   Change dressing every other day   You may remove the dressing and shower. Do not leave wound open to air, apply new dressing immediately.  Cleanse the wound with mild soap such as dove, pat dry.   Apply polymem AG  to the wound.  Cover with gauze or ABD pad  Secure with rolled gauze and tape    This was applied today at the [facility].    Elastic Tubular Stocking- Spandagrip F to Right leg    Tubular elastic bandage: Apply from base of toes to behind the knee. Apply in AM, may remove for sleep.  Avoid prolonged standing in one place.  Elevate leg(s) above the level of the heart when sitting or as much as possible.     Stop using the bacitracin ointment.  The wound and drainage may appear grayish or black for a few days, the doctor used silver nitrate on your wound today to control the bleeding.     Do not submerge the wound in water no swimming or bathtubs  Always keep a dressing on your wound, open to air is open to an infection.     Standing Status:   Future     Standing Expiration Date:   6/11/2024

## 2024-06-04 NOTE — PROGRESS NOTES
"Patient ID: Hina Gutierrez is a 79 y.o. female Date of Birth 1945     Chief Complaint  Chief Complaint   Patient presents with    New Patient Visit     Scraped RLE 5/30/24 and was seen at urgent care was prescribed bacitracin       Allergies  Doxycycline and Penicillins    Assessment:    No problem-specific Assessment & Plan notes found for this encounter.       Diagnoses and all orders for this visit:    Open wound of right lower extremity, initial encounter  -     lidocaine (LMX) 4 % cream  -     Wound cleansing and dressings Traumatic Right Pretibial; Future  -     Debridement Traumatic Right Pretibial  -     Wound Procedure Treatment Traumatic Right Pretibial    Other orders  -     Azelaic Acid 15 % cream; Apply 1 Application topically in the morning  -     Naproxen Sodium 220 MG CAPS; Take 220 mg by mouth 2 (two) times a day              Debridement   Wound 06/04/24 Traumatic Pretibial Right    Universal Protocol:  Consent: Verbal consent obtained.  Consent given by: patient  Time out: Immediately prior to procedure a \"time out\" was called to verify the correct patient, procedure, equipment, support staff and site/side marked as required.  Timeout called at: 6/4/2024 10:20 AM.  Patient understanding: patient states understanding of the procedure being performed  Patient identity confirmed: verbally with patient    Debridement Details  Performed by: physician  Debridement type: selective  Pain control: lidocaine 4%      Post-debridement measurements  Length (cm): 9.3  Width (cm): 5  Depth (cm): 0.1  Percent debrided: 25%  Surface Area (cm^2): 46.5  Area Debrided (cm^2): 11.63  Volume (cm^3): 4.65    Tissue and other material debrided: dermis  Devitalized tissue debrided: biofilm  Instrument(s) utilized: curette  Bleeding: small  Hemostasis obtained with: pressure  Response to treatment: procedure was tolerated well        Plan:  Initial evaluation  Debrided as above  Wound management with PolyMem " silver, see wound orders below  Light compression with Tubigrip  Keep legs elevated whenever seated and avoid prolonged standing  Never leave wound open to air  Do not submerge any body of water such as bathtub, hot tub, swimming pool, etc.  No harsh cleansers such as alcohol, peroxide, or antibacterial soap  Follow-up in 1 week or call sooner with questions or concerns    Wound 06/04/24 Traumatic Pretibial Right (Active)   Wound Image Images linked 06/04/24 1027   Wound Description Yellow;Epithelialization 06/04/24 1029   Ashlyn-wound Assessment Purple;Intact 06/04/24 1029   Wound Length (cm) 9.3 cm 06/04/24 1029   Wound Width (cm) 5 cm 06/04/24 1029   Wound Depth (cm) 0.1 cm 06/04/24 1029   Wound Surface Area (cm^2) 46.5 cm^2 06/04/24 1029   Wound Volume (cm^3) 4.65 cm^3 06/04/24 1029   Calculated Wound Volume (cm^3) 4.65 cm^3 06/04/24 1029   Drainage Amount Moderate 06/04/24 1029   Drainage Description Bloody 06/04/24 1029   Non-staged Wound Description Full thickness 06/04/24 1029   Treatments Cleansed 06/04/24 1029       Wound 06/04/24 Traumatic Pretibial Right (Active)   Date First Assessed/Time First Assessed: 06/04/24 1026   Present on Original Admission: Yes  Primary Wound Type: Traumatic  Location: Pretibial  Wound Location Orientation: Right       Subjective:      .    Patient presents for initial evaluation of a right lower extremity traumatic wound that happened after a fall on 5/30/2024.  She was seen in urgent care immediately after the incident and has been using an antibiotic ointment on the area.  She was also referred here to the wound management center for follow-up.  No diabetes.  No smoking.        The following portions of the patient's history were reviewed and updated as appropriate: She  has a past medical history of Anxiety, Disease of thyroid gland, Hypercholesterolemia, and Rosacea.  She   Patient Active Problem List    Diagnosis Date Noted    Chest pain in adult 07/17/2020    Acquired  "hypothyroidism 07/17/2020    Hyperlipidemia 07/17/2020    Hyperglycemia 07/17/2020    Leukocytosis 07/17/2020     She  reports that she has never smoked. She has never used smokeless tobacco. She reports current alcohol use. She reports that she does not currently use drugs.  She is allergic to doxycycline and penicillins..    Review of Systems   Constitutional:  Negative for chills and fever.   HENT:  Negative for congestion and sneezing.    Respiratory:  Negative for cough.    Cardiovascular:  Positive for leg swelling.   Skin:  Positive for wound.   Psychiatric/Behavioral:  Negative for agitation.          Objective:       Wound 06/04/24 Traumatic Pretibial Right (Active)   Wound Image Images linked 06/04/24 1027   Wound Description Yellow;Epithelialization 06/04/24 1029   Ashlyn-wound Assessment Purple;Intact 06/04/24 1029   Wound Length (cm) 9.3 cm 06/04/24 1029   Wound Width (cm) 5 cm 06/04/24 1029   Wound Depth (cm) 0.1 cm 06/04/24 1029   Wound Surface Area (cm^2) 46.5 cm^2 06/04/24 1029   Wound Volume (cm^3) 4.65 cm^3 06/04/24 1029   Calculated Wound Volume (cm^3) 4.65 cm^3 06/04/24 1029   Drainage Amount Moderate 06/04/24 1029   Drainage Description Bloody 06/04/24 1029   Non-staged Wound Description Full thickness 06/04/24 1029   Treatments Cleansed 06/04/24 1029       /65   Pulse 66   Temp (!) 96.8 °F (36 °C)   Resp 18   Ht 5' 1\" (1.549 m)   Wt 90.7 kg (200 lb)   BMI 37.79 kg/m²     Physical Exam  Vitals reviewed.   Constitutional:       General: She is not in acute distress.     Appearance: Normal appearance. She is obese. She is not ill-appearing, toxic-appearing or diaphoretic.   HENT:      Head: Normocephalic and atraumatic.      Right Ear: External ear normal.      Left Ear: External ear normal.   Eyes:      Conjunctiva/sclera: Conjunctivae normal.   Cardiovascular:      Pulses:           Dorsalis pedis pulses are 3+ on the right side.      Comments: Tortuous varicosities noted bilateral " lower extremities  Pulmonary:      Effort: Pulmonary effort is normal. No respiratory distress.   Musculoskeletal:      Cervical back: Neck supple.      Right lower leg: Edema present.      Left lower leg: Edema present.   Skin:     Comments: See wound assessment   Neurological:      Mental Status: She is alert.   Psychiatric:         Mood and Affect: Mood normal.         Behavior: Behavior normal.           Wound 06/04/24 Traumatic Pretibial Right (Active)   Wound Image   06/04/24 1027   Wound Description Yellow;Epithelialization 06/04/24 1029   Ashlyn-wound Assessment Purple;Intact 06/04/24 1029   Wound Length (cm) 9.3 cm 06/04/24 1029   Wound Width (cm) 5 cm 06/04/24 1029   Wound Depth (cm) 0.1 cm 06/04/24 1029   Wound Surface Area (cm^2) 46.5 cm^2 06/04/24 1029   Wound Volume (cm^3) 4.65 cm^3 06/04/24 1029   Calculated Wound Volume (cm^3) 4.65 cm^3 06/04/24 1029   Drainage Amount Moderate 06/04/24 1029   Drainage Description Bloody 06/04/24 1029   Non-staged Wound Description Full thickness 06/04/24 1029   Treatments Cleansed 06/04/24 1029                         Wound Instructions:  Orders Placed This Encounter   Procedures    Wound cleansing and dressings Traumatic Right Pretibial     Wound location Right leg   Change dressing every other day   You may remove the dressing and shower. Do not leave wound open to air, apply new dressing immediately.  Cleanse the wound with mild soap such as dove, pat dry.   Apply polymem AG  to the wound.  Cover with gauze or ABD pad  Secure with rolled gauze and tape    This was applied today at the [facility].    Elastic Tubular Stocking- Spandagrip F to Right leg    Tubular elastic bandage: Apply from base of toes to behind the knee. Apply in AM, may remove for sleep.  Avoid prolonged standing in one place.  Elevate leg(s) above the level of the heart when sitting or as much as possible.     Stop using the bacitracin ointment.  The wound and drainage may appear grayish or black for a  few days, the doctor used silver nitrate on your wound today to control the bleeding.     Do not submerge the wound in water no swimming or bathtubs  Always keep a dressing on your wound, open to air is open to an infection.     Standing Status:   Future     Standing Expiration Date:   6/11/2024    Debridement Traumatic Right Pretibial     This order was created via procedure documentation    Wound Procedure Treatment Traumatic Right Pretibial     This order was created via procedure documentation        Diagnosis ICD-10-CM Associated Orders   1. Open wound of right lower extremity, initial encounter  S81.801A lidocaine (LMX) 4 % cream     Wound cleansing and dressings Traumatic Right Pretibial     Debridement Traumatic Right Pretibial     Wound Procedure Treatment Traumatic Right Pretibial

## 2024-06-11 ENCOUNTER — OFFICE VISIT (OUTPATIENT)
Facility: CLINIC | Age: 79
End: 2024-06-11
Payer: MEDICARE

## 2024-06-11 VITALS
TEMPERATURE: 97.8 F | HEART RATE: 63 BPM | DIASTOLIC BLOOD PRESSURE: 66 MMHG | SYSTOLIC BLOOD PRESSURE: 123 MMHG | RESPIRATION RATE: 16 BRPM

## 2024-06-11 DIAGNOSIS — S81.801A OPEN WOUND OF RIGHT LOWER EXTREMITY, INITIAL ENCOUNTER: Primary | ICD-10-CM

## 2024-06-11 PROCEDURE — 11042 DBRDMT SUBQ TIS 1ST 20SQCM/<: CPT | Performed by: FAMILY MEDICINE

## 2024-06-11 PROCEDURE — 99214 OFFICE O/P EST MOD 30 MIN: CPT | Performed by: FAMILY MEDICINE

## 2024-06-11 RX ORDER — LIDOCAINE 40 MG/G
CREAM TOPICAL ONCE
Status: COMPLETED | OUTPATIENT
Start: 2024-06-11 | End: 2024-06-11

## 2024-06-11 RX ADMIN — LIDOCAINE 1 APPLICATION: 40 CREAM TOPICAL at 13:55

## 2024-06-11 NOTE — PROGRESS NOTES
Wound Procedure Treatment Traumatic Right Pretibial    Performed by: Dariel Harrington RN  Authorized by: Patito Kaiser MD    Associated wounds:   Wound 06/04/24 Traumatic Pretibial Right  Wound cleansed with:  NSS  Applied Topical: Mupirocin ointment    Applied secondary dressing:  Gauze  Dressing secured with:  Gunner, Tape, Elastic tubular stocking and Size G

## 2024-06-11 NOTE — PATIENT INSTRUCTIONS
Orders Placed This Encounter   Procedures    Wound cleansing and dressings Traumatic Right Pretibial     Wash your hands with soap and water.  Remove old dressing, discard into plastic bag and place in trash.    Cleanse the wound with a mild, unscented soap (Dove) prior to applying a clean dressing. Do not use tissue or cotton balls.   Do not scrub the wound. Pat dry using gauze.    You may shower but must keep the dressing/ dressing dry. You can cover the dressing with a cast cover that can be purchased online.    Do not leave wound open to air, apply new dressing immediately.    Apply Mupirocin to the R leg wound.   Cover with gauze or ABD pad  Secure with rolled gauze and tape and Spandigrip size G.    Change dressing every other day.    Watch for signs of infection (redness,warmth to the touch, increased pain, odor, pus, swelling, fever, chills, nausea, vomiting). If you notice any of these call the wound center or proceed to the ER.      Return in one week.     Standing Status:   Future     Standing Expiration Date:   6/18/2024    Debridement     This order was created via procedure documentation

## 2024-06-11 NOTE — PROGRESS NOTES
"Patient ID: Hina Gutierrez is a 79 y.o. female Date of Birth 1945       Chief Complaint   Patient presents with   • Follow Up Wound Care Visit     RLE wound       Allergies:  Doxycycline and Penicillins    Diagnosis:      Diagnosis ICD-10-CM Associated Orders   1. Open wound of right lower extremity, initial encounter  S81.801A lidocaine (LMX) 4 % cream     mupirocin (BACTROBAN) 2 % ointment     Debridement     Wound Procedure Treatment Traumatic Right Pretibial     Wound cleansing and dressings Traumatic Right Pretibial              Assessment & Plan:  Traumatic open wounds pretibial region right lower extremity: Measurements slightly improved.  Surgical debridement was completed for areas of slough fibrin biofilm.  No acute erythema, malodor, purulent drainage, lymphangitic streaking.  No induration, fluctuance, or crepitus. There is loose overlying skin on two areas w/in wound measurement that are attempting to reincorporate with healthy tissue. However I do suspect there may be some areas that failed to do so and will necrosis.  I did discuss this with patient.  Surgical debridement  Apply mupirocin to wounds followed by gauze or ABD  Encouraged utilization of Tubigrip for mild compression as she does have evidence of venous stasis to bilateral lower extremities  ER precautions reviewed, they expressed understanding  Follow-up in 1 week or sooner if needed    Portions of the record may have been created with voice recognition software. Occasional wrong word or \"sound alike\" substitutions may have occurred due to the inherent limitations of voice recognition software. Read the chart carefully and recognize, using context, where substitutions have occurred.    Subjective:   6/4/2024: Initial visit to wound center with Dr. Arellano.  Please see visit documentation    6/11/2024: Hina presented today along with her  for follow-up evaluation of traumatic wound right lower extremity.  At her last " wound visit, PolyMem Ag recommended to be used.  They reported that on some occasions the dressing did stick but easily removed after soaking with saline.  She has not had fever and/or chills.  No other new acute complaints.      The following portions of the patient's history were reviewed and updated as appropriate:   Patient Active Problem List   Diagnosis   • Chest pain in adult   • Acquired hypothyroidism   • Hyperlipidemia   • Hyperglycemia   • Leukocytosis     Past Medical History:   Diagnosis Date   • Anxiety    • Disease of thyroid gland    • Hypercholesterolemia    • Rosacea      Past Surgical History:   Procedure Laterality Date   • BACK SURGERY     • HYSTERECTOMY  1999     Family History   Problem Relation Age of Onset   • Heart disease Mother    • Heart disease Father    • No Known Problems Maternal Grandmother    • No Known Problems Maternal Grandfather    • Breast cancer Paternal Grandmother    • No Known Problems Paternal Grandfather    • No Known Problems Brother    • No Known Problems Maternal Aunt    • No Known Problems Maternal Aunt       Social History     Socioeconomic History   • Marital status: /Civil Union     Spouse name: None   • Number of children: None   • Years of education: None   • Highest education level: None   Occupational History   • None   Tobacco Use   • Smoking status: Never   • Smokeless tobacco: Never   Vaping Use   • Vaping status: Never Used   Substance and Sexual Activity   • Alcohol use: Yes   • Drug use: Not Currently   • Sexual activity: None   Other Topics Concern   • None   Social History Narrative   • None     Social Determinants of Health     Financial Resource Strain: Not on file   Food Insecurity: No Food Insecurity (3/20/2024)    [facility] from [facility]    Hunger Vital Sign    • Worried About Running Out of Food in the Last Year: Never true    • Ran Out of Food in the Last Year: Never true   Transportation Needs: Not on file   Physical Activity:  Not on file   Stress: Not on file   Social Connections: Not on file   Intimate Partner Violence: Unknown (6/4/2021)    [facility]    Intimate Partner Violence    • Within the last year, have you been afraid of your partner, ex-partner or family member?: Not on file    • Within the last year, have you been humiliated or emotionally abused in other ways by your partner, ex-partner or family member?: Not on file    • Within the last year, have you been kicked, hit, slapped, or otherwise physically hurt by your partner, ex-partner or family member?: Not on file    • Within the last year, have you been raped or forced to have any kind of sexual activity by your partner, ex-partner or family member?: Not on file   Housing Stability: Not on file        Current Outpatient Medications:   •  mupirocin (BACTROBAN) 2 % ointment, Please apply topically to R leg wound with dressing changes, Disp: 22 g, Rfl: 0  •  ALPRAZolam (XANAX) 0.25 mg tablet, Take by mouth (Patient not taking: Reported on 5/30/2024), Disp: , Rfl:   •  Azelaic Acid 15 % cream, Apply 1 Application topically in the morning, Disp: , Rfl:   •  Cholecalciferol (VITAMIN D3) 50 MCG (2000 UT) TABS, Take by mouth every other day , Disp: , Rfl:   •  Coenzyme Q10 200 MG capsule, Take by mouth daily , Disp: , Rfl:   •  FLUoxetine (PROzac) 10 mg capsule, Take 10 mg by mouth daily, Disp: , Rfl:   •  levothyroxine (Synthroid) 75 mcg tablet, Take by mouth daily in the early morning , Disp: , Rfl:   •  Multiple Vitamin (MULTI-VITAMIN) tablet, Take by mouth, Disp: , Rfl:   •  Naproxen Sodium 220 MG CAPS, Take 220 mg by mouth 2 (two) times a day, Disp: , Rfl:   •  Omega-3 Fatty Acids ([employer] FISH OIL) 1200 MG CAPS, Take by mouth (Patient not taking: Reported on 6/4/2024), Disp: , Rfl:   •  simvastatin (ZOCOR) 40 mg tablet, Take by mouth daily at bedtime , Disp: , Rfl:   •  Turmeric Curcumin 500 MG CAPS, Take by mouth daily   (Patient not taking: Reported on 6/4/2024), Disp: , Rfl:   •  vitamin B-12 (VITAMIN B-12) 1,000 mcg tablet, Take by mouth daily, Disp: , Rfl:     Review of Systems   Constitutional:  Negative for chills and fever.   Cardiovascular:  Negative for leg swelling.   Gastrointestinal:  Negative for vomiting.   Skin:  Positive for wound (Right lower extremity).   Psychiatric/Behavioral:  The patient is not nervous/anxious.        Objective:  /66   Pulse 63   Temp 97.8 °F (36.6 °C)   Resp 16         Physical Exam  Vitals reviewed.   Constitutional:       General: She is not in acute distress.     Appearance: She is not ill-appearing, toxic-appearing or diaphoretic.      Comments: Pleasant, NAD.  at bedside.   HENT:      Head: Normocephalic and atraumatic.   Eyes:      Conjunctiva/sclera: Conjunctivae normal.   Cardiovascular:      Rate and Rhythm: Normal rate.      Pulses:           Dorsalis pedis pulses are 2+ on the right side.        Posterior tibial pulses are 2+ on the right side.      Comments: Mild nonpitting edema bilaterally. Evidence of venous stasis on bilateral lower extremities with scattered varicosities, areas of hyperpigmentation/hemosiderin deposits  Pulmonary:      Effort: Pulmonary effort is normal. No respiratory distress.   Musculoskeletal:      Cervical back: Neck supple.      Right lower leg: Edema present.      Left lower leg: Edema present.   Skin:     General: Skin is warm.      Findings: Wound present.             Comments: Measurements slightly improved.  Surgical debridement was completed for areas of slough fibrin biofilm.  No acute erythema, malodor, purulent drainage, lymphangitic streaking.  No induration, fluctuance, or crepitus. There is loose overlying skin on two areas w/in wound measurement that are attempting to reincorporate with healthy tissue. However I do suspect there may be some areas that failed to do so and will necrosis.    Neurological:      Mental Status: She  "is alert and oriented to person, place, and time.   Psychiatric:         Mood and Affect: Mood normal.         Behavior: Behavior normal.         Wound 06/04/24 Traumatic Pretibial Right (Active)   Wound Image   06/11/24 1353   Wound Description Yellow;Epithelialization;Pink 06/11/24 1355   Ashlyn-wound Assessment Purple;Intact;[place] 06/11/24 1355   Wound Length (cm) 8.8 cm 06/11/24 1355   Wound Width (cm) 2.8 cm 06/11/24 1355   Wound Depth (cm) 0.1 cm 06/11/24 1355   Wound Surface Area (cm^2) 24.64 cm^2 06/11/24 1355   Wound Volume (cm^3) 2.464 cm^3 06/11/24 1355   Calculated Wound Volume (cm^3) 2.46 cm^3 06/11/24 1355   Change in Wound Size % 47.1 06/11/24 1355   Drainage Amount Moderate 06/11/24 1355   Drainage Description Serosanguineous 06/11/24 1355   Non-staged Wound Description Full thickness 06/11/24 1355   Treatments Cleansed 06/04/24 1029       Debridement    Universal Protocol:  Consent: Verbal consent obtained. Written consent obtained.  Risks and benefits: risks, benefits and alternatives were discussed  Consent given by: patient  Time out: Immediately prior to procedure a \"time out\" was called to verify the correct patient, procedure, equipment, support staff and site/side marked as required.  Patient understanding: patient states understanding of the procedure being performed  Patient identity confirmed: verbally with patient    Debridement Details  Performed by: physician  Debridement type: surgical  Level of debridement: subcutaneous tissue  Pain control: lidocaine 4%      Post-debridement measurements  Length (cm): 8.8  Width (cm): 2.8  Depth (cm): 0.2  Percent debrided: 40%  Surface Area (cm^2): 24.64  Area Debrided (cm^2): 9.86  Volume (cm^3): 4.93    Tissue and other material debrided: subcutaneous tissue  Devitalized tissue debrided: biofilm, fibrin and slough  Instrument(s) utilized: curette  Bleeding: medium  Hemostasis obtained with: pressure  Procedural pain (0-10): 4  Post-procedural pain: 0 "   Response to treatment: procedure was tolerated well                 Lab Results   Component Value Date    HGBA1C 5.7 (H) 03/05/2024       Wound Instructions:  Orders Placed This Encounter   Procedures   • Debridement     This order was created via procedure documentation   • Wound Procedure Treatment Traumatic Right Pretibial     This order was created via procedure documentation   • Wound cleansing and dressings Traumatic Right Pretibial     Wash your hands with soap and water.  Remove old dressing, discard into plastic bag and place in trash.    Cleanse the wound with a mild, unscented soap (Dove) prior to applying a clean dressing. Do not use tissue or cotton balls.   Do not scrub the wound. Pat dry using gauze.    You may shower but must keep the dressing/ dressing dry. You can cover the dressing with a cast cover that can be purchased online.    Do not leave wound open to air, apply new dressing immediately.    Apply Mupirocin to the R leg wound.   Cover with gauze or ABD pad  Secure with rolled gauze and tape and Spandigrip size G.    Change dressing every other day.    Watch for signs of infection (redness,warmth to the touch, increased pain, odor, pus, swelling, fever, chills, nausea, vomiting). If you notice any of these call the wound center or proceed to the ER.      Return in one week.     Standing Status:   Future     Standing Expiration Date:   6/18/2024         Patito Kaiser MD

## 2024-06-18 ENCOUNTER — OFFICE VISIT (OUTPATIENT)
Facility: CLINIC | Age: 79
End: 2024-06-18
Payer: MEDICARE

## 2024-06-18 VITALS
HEART RATE: 65 BPM | RESPIRATION RATE: 18 BRPM | SYSTOLIC BLOOD PRESSURE: 134 MMHG | TEMPERATURE: 96.6 F | DIASTOLIC BLOOD PRESSURE: 69 MMHG

## 2024-06-18 DIAGNOSIS — S81.801A OPEN WOUND OF RIGHT LOWER EXTREMITY, INITIAL ENCOUNTER: Primary | ICD-10-CM

## 2024-06-18 PROCEDURE — 97597 DBRDMT OPN WND 1ST 20 CM/<: CPT | Performed by: FAMILY MEDICINE

## 2024-06-18 RX ORDER — LIDOCAINE 40 MG/G
CREAM TOPICAL ONCE
Status: COMPLETED | OUTPATIENT
Start: 2024-06-18 | End: 2024-06-18

## 2024-06-18 RX ADMIN — LIDOCAINE 1 APPLICATION: 40 CREAM TOPICAL at 11:03

## 2024-06-18 NOTE — PROGRESS NOTES
Wound Procedure Treatment Traumatic Right Pretibial    Performed by: Dariel Harrington RN  Authorized by: Patito Kaiser MD    Associated wounds:   Wound 06/04/24 Traumatic Pretibial Right  Applied Topical: Mupirocin ointment    Applied secondary dressing:  ABD  Dressing secured with:  Gunner, Tape, Elastic tubular stocking and Size G

## 2024-06-18 NOTE — PROGRESS NOTES
"Patient ID: Hina Gutierrez is a 79 y.o. female Date of Birth 1945       Chief Complaint   Patient presents with   • Follow Up Wound Care Visit     RLE       Allergies:  Doxycycline and Penicillins    Diagnosis:      Diagnosis ICD-10-CM Associated Orders   1. Open wound of right lower extremity, initial encounter  S81.801A lidocaine (LMX) 4 % cream     Wound cleansing and dressings Traumatic Right Pretibial     Wound Procedure Treatment Traumatic Right Pretibial     Debridement              Assessment & Plan:  Traumatic open wound RLE pretibial region: Measurements are improved, two small open areas remain within one wound measurement.  Slough biofilm layer present at the edges of 2 open areas removed via selective debridement revealing granulation tissue.  No malodor, purulent drainage, induration, fluctuance, crepitus. There is very fragile damaged skin from the traumatic injury that has attempted to reincorporate back into healthy tissue.  It has done so quite nicely though there are still some areas of discoloration that remain which I reviewed may necrosis and require future removal. They expressed understanding.   Selective debridement  Continue to encourage use of spanda  for mild compression in setting venous stasis evidence on b/l LE  ER precautions again reviewed, they expressed understanding  Follow-up in 1 week or sooner if needed    Portions of the record may have been created with voice recognition software. Occasional wrong word or \"sound alike\" substitutions may have occurred due to the inherent limitations of voice recognition software. Read the chart carefully and recognize, using context, where substitutions have occurred.    Subjective:   6/4/2024: Initial visit to wound center with Dr. Arellano.  Please see visit documentation    6/11/2024: Hina presented today along with her  for follow-up evaluation of traumatic wound right lower extremity.  At her last wound visit, " PolyMem Ag recommended to be used.  They reported that on some occasions the dressing did stick but easily removed after soaking with saline.  She has not had fever and/or chills.  No other new acute complaints.    06/18/2024: Hina presents today for follow-up of traumatic wound RLE.  She reports she feels her wound looks better and she has no new acute complaints today.  She denies fever, chills.        The following portions of the patient's history were reviewed and updated as appropriate:   Patient Active Problem List   Diagnosis   • Chest pain in adult   • Acquired hypothyroidism   • Hyperlipidemia   • Hyperglycemia   • Leukocytosis     Past Medical History:   Diagnosis Date   • Anxiety    • Disease of thyroid gland    • Hypercholesterolemia    • Rosacea      Past Surgical History:   Procedure Laterality Date   • BACK SURGERY     • HYSTERECTOMY  1999     Family History   Problem Relation Age of Onset   • Heart disease Mother    • Heart disease Father    • No Known Problems Maternal Grandmother    • No Known Problems Maternal Grandfather    • Breast cancer Paternal Grandmother    • No Known Problems Paternal Grandfather    • No Known Problems Brother    • No Known Problems Maternal Aunt    • No Known Problems Maternal Aunt       Social History     Socioeconomic History   • Marital status: /Civil Union     Spouse name: None   • Number of children: None   • Years of education: None   • Highest education level: None   Occupational History   • None   Tobacco Use   • Smoking status: Never   • Smokeless tobacco: Never   Vaping Use   • Vaping status: Never Used   Substance and Sexual Activity   • Alcohol use: Yes   • Drug use: Not Currently   • Sexual activity: None   Other Topics Concern   • None   Social History Narrative   • None     Social Determinants of Health     Financial Resource Strain: Not on file   Food Insecurity: No Food Insecurity (3/20/2024)    [facility]    Hunger Vital  Sign    • Worried About Running Out of Food in the Last Year: Never true    • Ran Out of Food in the Last Year: Never true   Transportation Needs: Not on file   Physical Activity: Not on file   Stress: Not on file   Social Connections: Not on file   Intimate Partner Violence: Unknown (6/4/2021)    Received from [facility], [facility]    Intimate Partner Violence    • Within the last year, have you been afraid of your partner, ex-partner or family member?: Not on file    • Within the last year, have you been humiliated or emotionally abused in other ways by your partner, ex-partner or family member?: Not on file    • Within the last year, have you been kicked, hit, slapped, or otherwise physically hurt by your partner, ex-partner or family member?: Not on file    • Within the last year, have you been raped or forced to have any kind of sexual activity by your partner, ex-partner or family member?: Not on file   Housing Stability: Not on file        Current Outpatient Medications:   •  ALPRAZolam (XANAX) 0.25 mg tablet, Take by mouth (Patient not taking: Reported on 5/30/2024), Disp: , Rfl:   •  Azelaic Acid 15 % cream, Apply 1 Application topically in the morning, Disp: , Rfl:   •  Cholecalciferol (VITAMIN D3) 50 MCG (2000 UT) TABS, Take by mouth every other day , Disp: , Rfl:   •  Coenzyme Q10 200 MG capsule, Take by mouth daily , Disp: , Rfl:   •  FLUoxetine (PROzac) 10 mg capsule, Take 10 mg by mouth daily, Disp: , Rfl:   •  levothyroxine (Synthroid) 75 mcg tablet, Take by mouth daily in the early morning , Disp: , Rfl:   •  Multiple Vitamin (MULTI-VITAMIN) tablet, Take by mouth, Disp: , Rfl:   •  mupirocin (BACTROBAN) 2 % ointment, Please apply topically to R leg wound with dressing changes, Disp: 22 g, Rfl: 0  •  Naproxen Sodium 220 MG CAPS, Take 220 mg by mouth 2 (two) times a day, Disp: , Rfl:   •  Omega-3 Fatty Acids ([employer] FISH OIL) 1200 MG CAPS, Take by mouth (Patient not taking:  Reported on 6/4/2024), Disp: , Rfl:   •  simvastatin (ZOCOR) 40 mg tablet, Take by mouth daily at bedtime , Disp: , Rfl:   •  Turmeric Curcumin 500 MG CAPS, Take by mouth daily  (Patient not taking: Reported on 6/4/2024), Disp: , Rfl:   •  vitamin B-12 (VITAMIN B-12) 1,000 mcg tablet, Take by mouth daily, Disp: , Rfl:   No current facility-administered medications for this visit.    Review of Systems   Constitutional:  Negative for chills and fever.   Cardiovascular:  Negative for leg swelling.   Gastrointestinal:  Negative for vomiting.   Musculoskeletal:  Positive for gait problem (uses cane).   Skin:  Positive for wound (RLE).   Psychiatric/Behavioral:  The patient is not nervous/anxious.        Objective:  /69   Pulse 65   Temp (!) 96.6 °F (35.9 °C)   Resp 18   Pain Score:   4     Physical Exam  Vitals reviewed.   Constitutional:       General: She is not in acute distress.     Appearance: She is not ill-appearing, toxic-appearing or diaphoretic.      Comments: NAD, pleasant.  present at bedside.    HENT:      Head: Normocephalic and atraumatic.   Eyes:      Conjunctiva/sclera: Conjunctivae normal.   Cardiovascular:      Rate and Rhythm: Normal rate.      Pulses:           Dorsalis pedis pulses are 2+ on the right side.        Posterior tibial pulses are 2+ on the right side.      Comments: Mild nonpitting edema bilaterally. Evidence of venous stasis on bilateral lower extremities with scattered varicosities, areas of hyperpigmentation/hemosiderin deposits  Pulmonary:      Effort: Pulmonary effort is normal. No respiratory distress.   Musculoskeletal:      Cervical back: Neck supple.      Right lower leg: Edema present.      Left lower leg: Edema present.   Skin:     General: Skin is warm.      Findings: Wound present.             Comments: Measurements are improved, two small open areas remain within one wound measurement.  Slough biofilm layer present at the edges of 2 open areas removed via  "selective debridement revealing granulation tissue.  No malodor, purulent drainage, induration, fluctuance, crepitus. There is very fragile damaged skin from the traumatic injury that has attempted to reincorporate back into healthy tissue.  It has done so quite nicely though there are still some areas of discoloration that remain which I reviewed may necrosis and require future removal. They expressed understanding.    Neurological:      Mental Status: She is alert and oriented to person, place, and time.      Gait: Gait abnormal (ambulates w/ cane).   Psychiatric:         Mood and Affect: Mood normal.         Behavior: Behavior normal.         Wound 06/04/24 Traumatic Pretibial Right (Active)   Wound Image   06/18/24 1059   Wound Description Brown;Epithelialization;Pink 06/18/24 1059   Ashlyn-wound Assessment Pink;Fragile 06/18/24 1059   Wound Length (cm) 8.5 cm 06/18/24 1059   Wound Width (cm) 2.7 cm 06/18/24 1059   Wound Depth (cm) 0.1 cm 06/18/24 1059   Wound Surface Area (cm^2) 22.95 cm^2 06/18/24 1059   Wound Volume (cm^3) 2.295 cm^3 06/18/24 1059   Calculated Wound Volume (cm^3) 2.3 cm^3 06/18/24 1059   Change in Wound Size % 50.54 06/18/24 1059   Drainage Amount Moderate 06/18/24 1059   Drainage Description Serosanguineous 06/18/24 1059   Non-staged Wound Description Full thickness 06/18/24 1059   Treatments Cleansed 06/18/24 1059       Debridement    Universal Protocol:  Consent: Verbal consent obtained. Written consent obtained.  Consent given by: patient  Time out: Immediately prior to procedure a \"time out\" was called to verify the correct patient, procedure, equipment, support staff and site/side marked as required.  Patient understanding: patient states understanding of the procedure being performed  Patient identity confirmed: verbally with patient    Debridement Details  Performed by: physician  Debridement type: selective  Pain control: lidocaine 4%      Post-debridement measurements  Length (cm): " 8.5  Width (cm): 2.7  Depth (cm): 0.1  Percent debrided: 35%  Surface Area (cm^2): 22.95  Area Debrided (cm^2): 8.03  Volume (cm^3): 2.3    Tissue and other material debrided: dermis  Devitalized tissue debrided: fibrin and slough  Instrument(s) utilized: curette  Bleeding: small  Hemostasis obtained with: pressure  Procedural pain (0-10): 0  Post-procedural pain: 0   Response to treatment: procedure was tolerated well               Lab Results   Component Value Date    HGBA1C 5.7 (H) 03/05/2024       Wound Instructions:  Orders Placed This Encounter   Procedures   • Wound cleansing and dressings Traumatic Right Pretibial     Wash your hands with soap and water.  Remove old dressing, discard into plastic bag and place in trash.    Cleanse the wound with a mild, unscented soap (Dove) prior to applying a clean dressing. Do not use tissue or cotton balls.   Do not scrub the wound. Pat dry using gauze.     You may shower but must keep the dressing/ dressing dry. You can cover the dressing with a cast cover that can be purchased online.    Do not leave wound open to air, apply new dressing immediately.     Apply Mupirocin to the R leg wound.   Cover with gauze or ABD pad  Secure with rolled gauze and tape and Spandigrip size G.     Change dressing every other day.    Watch for signs of infection (redness,warmth to the touch, increased pain, odor, pus, swelling, fever, chills, nausea, vomiting). If you notice any of these call the wound center or proceed to the ER.       Return in one week.     Standing Status:   Future     Standing Expiration Date:   6/25/2024   • Wound Procedure Treatment Traumatic Right Pretibial     This order was created via procedure documentation   • Debridement     This order was created via procedure documentation         Patito Kaiser MD

## 2024-06-18 NOTE — PATIENT INSTRUCTIONS
Orders Placed This Encounter   Procedures    Wound cleansing and dressings Traumatic Right Pretibial     Wash your hands with soap and water.  Remove old dressing, discard into plastic bag and place in trash.    Cleanse the wound with a mild, unscented soap (Dove) prior to applying a clean dressing. Do not use tissue or cotton balls.   Do not scrub the wound. Pat dry using gauze.     You may shower but must keep the dressing/ dressing dry. You can cover the dressing with a cast cover that can be purchased online.    Do not leave wound open to air, apply new dressing immediately.     Apply Mupirocin to the R leg wound.   Cover with gauze or ABD pad  Secure with rolled gauze and tape and Spandigrip size G.     Change dressing every other day.    Watch for signs of infection (redness,warmth to the touch, increased pain, odor, pus, swelling, fever, chills, nausea, vomiting). If you notice any of these call the wound center or proceed to the ER.       Return in one week.     Standing Status:   Future     Standing Expiration Date:   6/25/2024

## 2024-06-25 ENCOUNTER — OFFICE VISIT (OUTPATIENT)
Facility: CLINIC | Age: 79
End: 2024-06-25
Payer: MEDICARE

## 2024-06-25 VITALS
DIASTOLIC BLOOD PRESSURE: 78 MMHG | TEMPERATURE: 97.6 F | SYSTOLIC BLOOD PRESSURE: 142 MMHG | RESPIRATION RATE: 18 BRPM | HEART RATE: 65 BPM

## 2024-06-25 DIAGNOSIS — S81.801A OPEN WOUND OF RIGHT LOWER EXTREMITY, INITIAL ENCOUNTER: Primary | ICD-10-CM

## 2024-06-25 PROCEDURE — 11042 DBRDMT SUBQ TIS 1ST 20SQCM/<: CPT | Performed by: FAMILY MEDICINE

## 2024-06-25 RX ORDER — LIDOCAINE 40 MG/G
CREAM TOPICAL ONCE
Status: COMPLETED | OUTPATIENT
Start: 2024-06-25 | End: 2024-06-25

## 2024-06-25 RX ADMIN — LIDOCAINE 1 APPLICATION: 40 CREAM TOPICAL at 15:05

## 2024-06-25 NOTE — PATIENT INSTRUCTIONS
Orders Placed This Encounter   Procedures    Wound cleansing and dressings Traumatic Right Pretibial     Wound cleansing and dressings Traumatic Right Pretibial   Wash your hands with soap and water.  Remove old dressing, discard into plastic bag and place in trash.    Cleanse the wound with a mild, unscented soap (Dove) prior to applying a clean dressing. Do not use tissue or cotton balls.   Do not scrub the wound. Pat dry using gauze.     Avoid showering for the week  Do not leave wound open to air, apply new dressing immediately.     Apply poly mem Ag to the R leg wound.   Cover with gauze or ABD pad  Secure with rolled gauze and tape and Spandigrip size F.  Change dressing every other day.     Obtain Am Lactin moisturizer and apply to bilateral lower extremity daily   Watch for signs of infection (redness,warmth to the touch, increased pain, odor, pus, swelling, fever, chills, nausea, vomiting). If you notice any of these call the wound center or proceed to the ER.       Return in one week.     Standing Status:   Future     Standing Expiration Date:   7/2/2024

## 2024-06-25 NOTE — PROGRESS NOTES
Wound Procedure Treatment Traumatic Right Pretibial    Performed by: Linda Huerta RN  Authorized by: Patito Kaiser MD    Associated wounds:   Wound 06/04/24 Traumatic Pretibial Right  Wound cleansed with:  Soap and water  Applied primary dressing:  Polymem foam and Silver  Applied secondary dressing:  ABD  Dressing secured with:  Gunner, Elastic tubular stocking and Size F

## 2024-06-25 NOTE — PROGRESS NOTES
"Patient ID: Hina Gutierrez is a 79 y.o. female Date of Birth 1945       Chief Complaint   Patient presents with   • Follow Up Wound Care Visit     Right lower leg wound       Allergies:  Doxycycline and Penicillins    Diagnosis:      Diagnosis ICD-10-CM Associated Orders   1. Open wound of right lower extremity, initial encounter  S81.801A Wound cleansing and dressings Traumatic Right Pretibial     lidocaine (LMX) 4 % cream     Debridement     Wound Procedure Treatment Traumatic Right Pretibial              Assessment & Plan:  Traumatic open wound right lower extremity: Measurements overall unchanged remains w/ two small open areas within single wound measurement.  Open areas with fibrin and slough requiring surgical debridement.  Bilateral lower extremity edema is slightly increased today.   Surgical debridement   Apply PolyMem Ag to wound bed followed by continued use of Spandagrip   Moisturize skin of B/L LE   Emphasized importance of regular lower extremity elevation and avoiding prolonged time spent with legs in dependent position  ER precautions reviewed, she expresses understanding   Follow up in 1 week or sooner if needed     Portions of the record may have been created with voice recognition software. Occasional wrong word or \"sound alike\" substitutions may have occurred due to the inherent limitations of voice recognition software. Read the chart carefully and recognize, using context, where substitutions have occurred.    Subjective:   6/4/2024: Initial visit to wound center with Dr. Arellano.  Please see visit documentation    6/11/2024: Hina presented today along with her  for follow-up evaluation of traumatic wound right lower extremity.  At her last wound visit, PolyMem Ag recommended to be used.  They reported that on some occasions the dressing did stick but easily removed after soaking with saline.  She has not had fever and/or chills.  No other new acute " complaints.    06/18/2024: Hina presents today for follow-up of traumatic wound RLE.  She reports she feels her wound looks better and she has no new acute complaints today.  She denies fever, chills.    6/25/2024: Hina presents today for follow-up of traumatic wound right lower extremity.  She reports she has no new acute complaints today.  She denies fever, chills.  Does try to elevate her legs when she is resting.      The following portions of the patient's history were reviewed and updated as appropriate:   Patient Active Problem List   Diagnosis   • Chest pain in adult   • Acquired hypothyroidism   • Hyperlipidemia   • Hyperglycemia   • Leukocytosis     Past Medical History:   Diagnosis Date   • Anxiety    • Disease of thyroid gland    • Hypercholesterolemia    • Rosacea      Past Surgical History:   Procedure Laterality Date   • BACK SURGERY     • HYSTERECTOMY  1999     Family History   Problem Relation Age of Onset   • Heart disease Mother    • Heart disease Father    • No Known Problems Maternal Grandmother    • No Known Problems Maternal Grandfather    • Breast cancer Paternal Grandmother    • No Known Problems Paternal Grandfather    • No Known Problems Brother    • No Known Problems Maternal Aunt    • No Known Problems Maternal Aunt       Social History     Socioeconomic History   • Marital status: /Civil Union     Spouse name: Not on file   • Number of children: Not on file   • Years of education: Not on file   • Highest education level: Not on file   Occupational History   • Not on file   Tobacco Use   • Smoking status: Never   • Smokeless tobacco: Never   Vaping Use   • Vaping status: Never Used   Substance and Sexual Activity   • Alcohol use: Yes   • Drug use: Not Currently   • Sexual activity: Not on file   Other Topics Concern   • Not on file   Social History Narrative   • Not on file     Social Determinants of Health     Financial Resource Strain: Low Risk  (3/20/2024)    Received from  [facility]    Financial Resource Strain    • Do you have any trouble paying for your medications, or do you think you might in the future?: No    • Does your family have trouble paying for medicine? (Household - for ages 0-17 years): Not on file   Food Insecurity: No Food Insecurity (3/20/2024)    Received from [facility]    Food Insecurity    • Do you need food for this week?: No    • Are you able to get enough food for your family? (Household - for ages 0-17 years): Not on file    • Does your family need food this week? (Household - for ages 0-17 years): Not on file    • Do you always have enough food for your family? (Household - for ages 0-17 years): Not on file   Transportation Needs: No Transportation Needs (3/20/2024)    Received from [facility]    Transportation Needs    • READ ONLY Do you have trouble getting a ride to medical visits or work?: Never True    • Does your family have a hard time getting a ride to doctors’ visits? (Household - for ages 0-17 years): Not on file    • Has lack of transportation kept you from medical appointments, meetings, work, or from getting things needed for daily living? Check all that apply. (Adult - for ages 18 years and over): Not on file    • Do you (or your family) have trouble finding or paying for a ride (transportation)? (Household - for ages 0-17 years): Not on file   Physical Activity: Not on file   Stress: Not on file   Social Connections: Socially Integrated (3/20/2024)    Received from [facility]    Social Connections    • How often do you feel lonely or isolated from those around you?: Never   Intimate Partner Violence: Unknown (6/4/2021)    [facility]    Intimate Partner Violence    • Within the last year, have you been afraid of your partner, ex-partner or family member?: Not on file    • Within the last year, have you been humiliated or emotionally abused in other ways by your partner, ex-partner or family  member?: Not on file    • Within the last year, have you been kicked, hit, slapped, or otherwise physically hurt by your partner, ex-partner or family member?: Not on file    • Within the last year, have you been raped or forced to have any kind of sexual activity by your partner, ex-partner or family member?: Not on file   Housing Stability: Low Risk  (3/20/2024)    [facility] Stability    • Do you currently live in a shelter or have no steady place to sleep at night?: No    • READ ONLY Do you think you are at risk of becoming homeless?: No    • Does your family worry about paying for your home or becoming homeless? (Household - for ages 0-17 years): Not on file    • Are you homeless or worried that you might be in the future? (Adult - for ages 18 years and over): Not on file    • Are you (or your family) homeless or worried that you might be in the future? (Household - for ages 0-17 years): Not on file        Current Outpatient Medications:   •  ALPRAZolam (XANAX) 0.25 mg tablet, Take by mouth (Patient not taking: Reported on 5/30/2024), Disp: , Rfl:   •  Azelaic Acid 15 % cream, Apply 1 Application topically in the morning, Disp: , Rfl:   •  Cholecalciferol (VITAMIN D3) 50 MCG (2000 UT) TABS, Take by mouth every other day , Disp: , Rfl:   •  Coenzyme Q10 200 MG capsule, Take by mouth daily , Disp: , Rfl:   •  FLUoxetine (PROzac) 10 mg capsule, Take 10 mg by mouth daily, Disp: , Rfl:   •  levothyroxine (Synthroid) 75 mcg tablet, Take by mouth daily in the early morning , Disp: , Rfl:   •  Multiple Vitamin (MULTI-VITAMIN) tablet, Take by mouth, Disp: , Rfl:   •  mupirocin (BACTROBAN) 2 % ointment, Please apply topically to R leg wound with dressing changes, Disp: 22 g, Rfl: 0  •  Naproxen Sodium 220 MG CAPS, Take 220 mg by mouth 2 (two) times a day, Disp: , Rfl:   •  Omega-3 Fatty Acids ([employer] FISH OIL) 1200 MG CAPS, Take by mouth (Patient not taking: Reported on 6/4/2024), Disp: , Rfl:   •   simvastatin (ZOCOR) 40 mg tablet, Take by mouth daily at bedtime , Disp: , Rfl:   •  Turmeric Curcumin 500 MG CAPS, Take by mouth daily  (Patient not taking: Reported on 2024), Disp: , Rfl:   •  vitamin B-12 (VITAMIN B-12) 1,000 mcg tablet, Take by mouth daily, Disp: , Rfl:   No current facility-administered medications for this visit.    Review of Systems   Constitutional:  Negative for chills and fever.   Cardiovascular:  Negative for leg swelling.   Gastrointestinal:  Negative for vomiting.   Musculoskeletal:  Positive for gait problem (ambulates w/ cane).   Skin:  Positive for wound (RLE).   Psychiatric/Behavioral:  The patient is not nervous/anxious.        Objective:  /78   Pulse 65   Temp 97.6 °F (36.4 °C)   Resp 18   Pain Score:   3     Physical Exam  Vitals and nursing note reviewed.   Constitutional:       General: She is not in acute distress.     Appearance: She is not ill-appearing, toxic-appearing or diaphoretic.      Comments: Very pleasant, no acute distress.   HENT:      Head: Normocephalic and atraumatic.   Eyes:      Conjunctiva/sclera: Conjunctivae normal.   Cardiovascular:      Rate and Rhythm: Normal rate.      Pulses:           Dorsalis pedis pulses are 2+ on the right side.        Posterior tibial pulses are 2+ on the right side.      Comments: Bilateral lower extremity edema slightly increased today, remains predominantly nonpitting around 1+    Remains w/ evidence of venous stasis on bilateral lower extremities with scattered varicosities, areas of hyperpigmentation/hemosiderin deposits  Pulmonary:      Effort: Pulmonary effort is normal. No respiratory distress.   Musculoskeletal:      Right lower le+ Edema present.      Left lower le+ Edema present.   Skin:     General: Skin is warm.      Findings: Wound present.             Comments: 1- Measurements overall unchanged remains w/ two small open areas within single wound measurement.  Open areas with fibrin and slough  "requiring surgical debridement.  No purulent drainage, acute erythema, lymphangitic streaking, induration, fluctuance.   Neurological:      Mental Status: She is alert and oriented to person, place, and time.      Gait: Gait abnormal (ambulates w/ cane).   Psychiatric:         Mood and Affect: Mood normal.         Behavior: Behavior normal.         Wound 06/04/24 Traumatic Pretibial Right (Active)   Wound Image   06/25/24 1459   Wound Description Epithelialization;Pink;Yellow 06/25/24 1501   Ashlyn-wound Assessment Pink;Fragile;Purple 06/25/24 1501   Wound Length (cm) 8.5 cm 06/25/24 1501   Wound Width (cm) 2.7 cm 06/25/24 1501   Wound Depth (cm) 0.1 cm 06/25/24 1501   Wound Surface Area (cm^2) 22.95 cm^2 06/25/24 1501   Wound Volume (cm^3) 2.295 cm^3 06/25/24 1501   Calculated Wound Volume (cm^3) 2.3 cm^3 06/25/24 1501   Change in Wound Size % 50.54 06/25/24 1501   Drainage Amount Moderate 06/25/24 1501   Drainage Description Serosanguineous 06/25/24 1501   Non-staged Wound Description Full thickness 06/25/24 1501   Treatments Cleansed 06/18/24 1059         Debridement    Universal Protocol:  Consent: Verbal consent obtained. Written consent obtained.  Risks and benefits: risks, benefits and alternatives were discussed  Consent given by: patient  Time out: Immediately prior to procedure a \"time out\" was called to verify the correct patient, procedure, equipment, support staff and site/side marked as required.  Patient understanding: patient states understanding of the procedure being performed  Patient identity confirmed: verbally with patient    Debridement Details  Performed by: physician  Debridement type: surgical  Level of debridement: subcutaneous tissue  Pain control: lidocaine 4%      Post-debridement measurements  Length (cm): 8.5  Width (cm): 2.7  Depth (cm): 0.2  Percent debrided: 50%  Surface Area (cm^2): 22.95  Area Debrided (cm^2): 11.48  Volume (cm^3): 4.59    Tissue and other material debrided: " subcutaneous tissue  Devitalized tissue debrided: fibrin and slough  Instrument(s) utilized: curette  Bleeding: medium  Hemostasis obtained with: pressure and silver nitrate  Procedural pain (0-10): 0  Post-procedural pain: 0   Response to treatment: procedure was tolerated well                   Lab Results   Component Value Date    HGBA1C 5.7 (H) 03/05/2024       Wound Instructions:  Orders Placed This Encounter   Procedures   • Wound cleansing and dressings Traumatic Right Pretibial     Wound cleansing and dressings Traumatic Right Pretibial   Wash your hands with soap and water.  Remove old dressing, discard into plastic bag and place in trash.    Cleanse the wound with a mild, unscented soap (Dove) prior to applying a clean dressing. Do not use tissue or cotton balls.   Do not scrub the wound. Pat dry using gauze.     Avoid showering for the week  Do not leave wound open to air, apply new dressing immediately.     Apply poly mem Ag to the R leg wound.   Cover with gauze or ABD pad  Secure with rolled gauze and tape and Spandigrip size F.  Change dressing every other day.     Obtain Am Lactin moisturizer and apply to bilateral lower extremity daily   Watch for signs of infection (redness,warmth to the touch, increased pain, odor, pus, swelling, fever, chills, nausea, vomiting). If you notice any of these call the wound center or proceed to the ER.       Return in one week.     Standing Status:   Future     Standing Expiration Date:   7/2/2024   • Debridement     This order was created via procedure documentation   • Wound Procedure Treatment Traumatic Right Pretibial     This order was created via procedure documentation       Patito Kaiser MD

## 2024-07-02 ENCOUNTER — OFFICE VISIT (OUTPATIENT)
Facility: CLINIC | Age: 79
End: 2024-07-02
Payer: MEDICARE

## 2024-07-02 VITALS
RESPIRATION RATE: 18 BRPM | HEART RATE: 66 BPM | TEMPERATURE: 97.8 F | SYSTOLIC BLOOD PRESSURE: 139 MMHG | DIASTOLIC BLOOD PRESSURE: 66 MMHG

## 2024-07-02 DIAGNOSIS — S81.801A OPEN WOUND OF RIGHT LOWER EXTREMITY, INITIAL ENCOUNTER: Primary | ICD-10-CM

## 2024-07-02 PROCEDURE — 11042 DBRDMT SUBQ TIS 1ST 20SQCM/<: CPT | Performed by: FAMILY MEDICINE

## 2024-07-02 RX ORDER — LIDOCAINE 40 MG/G
CREAM TOPICAL ONCE
Status: COMPLETED | OUTPATIENT
Start: 2024-07-02 | End: 2024-07-02

## 2024-07-02 RX ADMIN — LIDOCAINE: 40 CREAM TOPICAL at 11:41

## 2024-07-02 NOTE — PROGRESS NOTES
"Patient ID: Hina Gutierrez is a 79 y.o. female Date of Birth 1945       Chief Complaint   Patient presents with   • Follow Up Wound Care Visit     right lower extremity wound       Allergies:  Doxycycline and Penicillins    Diagnosis:      Diagnosis ICD-10-CM Associated Orders   1. Open wound of right lower extremity, initial encounter  S81.801A lidocaine (LMX) 4 % cream     Wound cleansing and dressings Traumatic Right Pretibial     Debridement              Assessment & Plan:  Traumatic open wound RLE: Measurements slightly improved today, does remain with two small open areas within single wound measurement however there is increased epithelization throughout entire area.  Surgical debridement completed of areas of hypergranulation tissue and slough.  No current evidence of acute soft tissue infection at this time.  Surgical debridement  She had utilize mupirocin ointment for her last dressing changes as she ran out of the PolyMem Ag, considering her drainage has decreased we will continue with mupirocin ointment.  Continued use of Spanda  recommended  Continue to recommend regular LE elevation and avoiding prolonged periods of time with legs in dependent position  ER precautions again reviewed, she expressed understanding  Follow up in 1 week or sooner if needed     Portions of the record may have been created with voice recognition software. Occasional wrong word or \"sound alike\" substitutions may have occurred due to the inherent limitations of voice recognition software. Read the chart carefully and recognize, using context, where substitutions have occurred.    Subjective:   6/4/2024: Initial visit to wound center with Dr. Arellano.  Please see visit documentation    6/11/2024: Hina presented today along with her  for follow-up evaluation of traumatic wound right lower extremity.  At her last wound visit, PolyMem Ag recommended to be used.  They reported that on some occasions the dressing " did stick but easily removed after soaking with saline.  She has not had fever and/or chills.  No other new acute complaints.    06/18/2024: Hina presents today for follow-up of traumatic wound RLE.  She reports she feels her wound looks better and she has no new acute complaints today.  She denies fever, chills.    6/25/2024: Hina presents today for follow-up of traumatic wound right lower extremity.  She reports she has no new acute complaints today.  She denies fever, chills.  Does try to elevate her legs when she is resting.    7/2/2024: Hina presents today for f/u of RLE wound.  She reports she feels her wound is doing well.  States that she ran out of the [employer] Ag so utilized the mupirocin ointment for her most recent dressing change.  She has not had fever and/or chills.  Continues to attempt to elevate her lower extremities most times when she is resting.        The following portions of the patient's history were reviewed and updated as appropriate:   Patient Active Problem List   Diagnosis   • Chest pain in adult   • Acquired hypothyroidism   • Hyperlipidemia   • Hyperglycemia   • Leukocytosis     Past Medical History:   Diagnosis Date   • Anxiety    • Disease of thyroid gland    • Hypercholesterolemia    • Rosacea      Past Surgical History:   Procedure Laterality Date   • BACK SURGERY     • HYSTERECTOMY  1999     Family History   Problem Relation Age of Onset   • Heart disease Mother    • Heart disease Father    • No Known Problems Maternal Grandmother    • No Known Problems Maternal Grandfather    • Breast cancer Paternal Grandmother    • No Known Problems Paternal Grandfather    • No Known Problems Brother    • No Known Problems Maternal Aunt    • No Known Problems Maternal Aunt       Social History     Socioeconomic History   • Marital status: /Civil Union     Spouse name: Not on file   • Number of children: Not on file   • Years of education: Not on file   • Highest education level: Not  on file   Occupational History   • Not on file   Tobacco Use   • Smoking status: Never   • Smokeless tobacco: Never   Vaping Use   • Vaping status: Never Used   Substance and Sexual Activity   • Alcohol use: Yes   • Drug use: Not Currently   • Sexual activity: Not on file   Other Topics Concern   • Not on file   Social History Narrative   • Not on file     Social Determinants of Health     Financial Resource Strain: Low Risk  (3/20/2024)    Received from [email or web address]    Financial Resource Strain    • Do you have any trouble paying for your medications, or do you think you might in the future?: No    • Does your family have trouble paying for medicine? (Household - for ages 0-17 years): Not on file   Food Insecurity: No Food Insecurity (3/20/2024)    Received from [email or web address]    Food Insecurity    • Do you need food for this week?: No    • Are you able to get enough food for your family? (Household - for ages 0-17 years): Not on file    • Does your family need food this week? (Household - for ages 0-17 years): Not on file    • Do you always have enough food for your family? (Household - for ages 0-17 years): Not on file   Transportation Needs: No Transportation Needs (3/20/2024)    Received from [email or web address]    Transportation Needs    • READ ONLY Do you have trouble getting a ride to medical visits or work?: Never True    • Does your family have a hard time getting a ride to doctors’ visits? (Household - for ages 0-17 years): Not on file    • Has lack of transportation kept you from medical appointments, meetings, work, or from getting things needed for daily living? Check all that apply. (Adult - for ages 18 years and over): Not on file    • Do you (or your family) have trouble finding or paying for a ride (transportation)? (Household - for ages 0-17 years): Not on file   Physical Activity: Not on file   Stress: Not on file   Social Connections: Socially Integrated (3/20/2024)    Received from [facility]    • How often do you feel lonely or isolated from those around you?: Never   Intimate Partner Violence: Unknown (6/4/2021)    [facility]    Intimate Partner Violence    • Within the last year, have you been afraid of your partner, ex-partner or family member?: Not on file    • Within the last year, have you been humiliated or emotionally abused in other ways by your partner, ex-partner or family member?: Not on file    • Within the last year, have you been kicked, hit, slapped, or otherwise physically hurt by your partner, ex-partner or family member?: Not on file    • Within the last year, have you been raped or forced to have any kind of sexual activity by your partner, ex-partner or family member?: Not on file   Housing Stability: Low Risk  (3/20/2024)    Received from [facility]    • Do you currently live in a shelter or have no steady place to sleep at night?: No    • READ ONLY Do you think you are at risk of becoming homeless?: No    • Does your family worry about paying for your home or becoming homeless? (Household - for ages 0-17 years): Not on file    • Are you homeless or worried that you might be in the future? (Adult - for ages 18 years and over): Not on file    • Are you (or your family) homeless or worried that you might be in the future? (Household - for ages 0-17 years): Not on file        Current Outpatient Medications:   •  ALPRAZolam (XANAX) 0.25 mg tablet, Take by mouth (Patient not taking: Reported on 5/30/2024), Disp: , Rfl:   •  Azelaic Acid 15 % cream, Apply 1 Application topically in the morning, Disp: , Rfl:   •  Cholecalciferol (VITAMIN D3) 50 MCG (2000 UT) TABS, Take by mouth every other day , Disp: , Rfl:   •  Coenzyme Q10 200 MG capsule, Take by mouth daily , Disp: , Rfl:   •  FLUoxetine (PROzac) 10 mg capsule, Take 10 mg by mouth daily, Disp: , Rfl:   •  levothyroxine (Synthroid) 75 mcg tablet, Take  by mouth daily in the early morning , Disp: , Rfl:   •  Multiple Vitamin (MULTI-VITAMIN) tablet, Take by mouth, Disp: , Rfl:   •  mupirocin (BACTROBAN) 2 % ointment, Please apply topically to R leg wound with dressing changes, Disp: 22 g, Rfl: 0  •  Naproxen Sodium 220 MG CAPS, Take 220 mg by mouth 2 (two) times a day, Disp: , Rfl:   •  Omega-3 Fatty Acids ([employer] FISH OIL) 1200 MG CAPS, Take by mouth (Patient not taking: Reported on 6/4/2024), Disp: , Rfl:   •  simvastatin (ZOCOR) 40 mg tablet, Take by mouth daily at bedtime , Disp: , Rfl:   •  Turmeric Curcumin 500 MG CAPS, Take by mouth daily  (Patient not taking: Reported on 6/4/2024), Disp: , Rfl:   •  vitamin B-12 (VITAMIN B-12) 1,000 mcg tablet, Take by mouth daily, Disp: , Rfl:   No current facility-administered medications for this visit.    Review of Systems   Constitutional:  Negative for chills and fever.   Cardiovascular:  Negative for leg swelling.   Gastrointestinal:  Negative for vomiting.   Musculoskeletal:  Positive for gait problem (ambulates w/ cane).   Skin:  Positive for wound (RLE).   Psychiatric/Behavioral:  The patient is not nervous/anxious.        Objective:  /66   Pulse 66   Temp 97.8 °F (36.6 °C)   Resp 18         Physical Exam  Vitals and nursing note reviewed.   Constitutional:       General: She is not in acute distress.     Appearance: She is not ill-appearing, toxic-appearing or diaphoretic.      Comments: Very pleasant, no acute distress.   HENT:      Head: Normocephalic and atraumatic.   Eyes:      Conjunctiva/sclera: Conjunctivae normal.   Cardiovascular:      Rate and Rhythm: Normal rate.      Pulses:           Dorsalis pedis pulses are 2+ on the right side.        Posterior tibial pulses are 2+ on the right side.      Comments: Bilateral lower extremity edema stable - predominantly non pitting, around 1+  C/w evidence of venous stasis on bilateral lower extremities with scattered varicosities, areas of  "hyperpigmentation/hemosiderin deposits  Pulmonary:      Effort: Pulmonary effort is normal. No respiratory distress.   Musculoskeletal:      Right lower le+ Edema present.      Left lower le+ Edema present.   Skin:     General: Skin is warm.      Findings: Wound present.             Comments: 1-  Measurements slightly improved today, does remain with two small open areas within single wound measurement however there is increased epithelization throughout entire area.  Surgical debridement completed of areas of hypergranulation tissue and slough.  No purulent drainage, acute erythema, lymphangitic streaking, induration, fluctuance.   Neurological:      Mental Status: She is alert and oriented to person, place, and time.      Gait: Gait abnormal (ambulates w/ cane).   Psychiatric:         Mood and Affect: Mood normal.         Behavior: Behavior normal.         Wound 24 Traumatic Pretibial Right (Active)   Wound Image   24 1141   Wound Description Epithelialization;Pink;Yellow;Granulation tissue 24 1141   Ashlyn-wound Assessment Scar Tissue 24 1141   Wound Length (cm) 9.5 cm 24 1141   Wound Width (cm) 2 cm 24 1141   Wound Depth (cm) 0.2 cm 24 1141   Wound Surface Area (cm^2) 19 cm^2 24 1141   Wound Volume (cm^3) 3.8 cm^3 24 1141   Calculated Wound Volume (cm^3) 3.8 cm^3 24 1141   Change in Wound Size % 18.28 24 1141   Drainage Amount Small 24 1141   Drainage Description Yellow;Serosanguineous 24 1141   Non-staged Wound Description Full thickness 24 1141   Treatments Cleansed 24 1059       Debridement    Universal Protocol:  Consent: Verbal consent obtained. Written consent obtained.  Risks and benefits: risks, benefits and alternatives were discussed  Consent given by: patient  Time out: Immediately prior to procedure a \"time out\" was called to verify the correct patient, procedure, equipment, support staff and site/side " marked as required.  Patient understanding: patient states understanding of the procedure being performed  Patient identity confirmed: verbally with patient    Debridement Details  Performed by: physician  Debridement type: surgical  Level of debridement: subcutaneous tissue  Pain control: lidocaine 4%      Post-debridement measurements  Length (cm): 9.5  Width (cm): 2  Depth (cm): 0.3  Percent debrided: 45%  Surface Area (cm^2): 19  Area Debrided (cm^2): 8.55  Volume (cm^3): 5.7    Tissue and other material debrided: hypergranulation and subcutaneous tissue  Devitalized tissue debrided: fibrin and slough  Instrument(s) utilized: curette  Bleeding: medium  Hemostasis obtained with: pressure and silver nitrate  Procedural pain (0-10): 0  Post-procedural pain: 0   Response to treatment: procedure was tolerated well                   Lab Results   Component Value Date    HGBA1C 5.7 (H) 03/05/2024       Wound Instructions:  Orders Placed This Encounter   Procedures   • Wound cleansing and dressings Traumatic Right Pretibial     Wash your hands with soap and water.  Remove old dressing, discard into plastic bag and place in trash.    Cleanse the wound with a mild, unscented soap (Dove) prior to applying a clean dressing. Do not use tissue or cotton balls.   Do not scrub the wound. Pat dry using gauze.     Avoid showering for the week  Do not leave wound open to air, apply new dressing immediately.     Apply Mupirocin to the R leg wound.   Cover with gauze or ABD pad  Secure with rolled gauze and tape and Spandigrip size F.  Change dressing every other day.     Obtain AmLactin moisturizer and apply to bilateral lower extremity daily   Watch for signs of infection (redness,warmth to the touch, increased pain, odor, pus, swelling, fever, chills, nausea, vomiting). If you notice any of these call the wound center or proceed to the ER.     Standing Status:   Future     Standing Expiration Date:   7/9/2024   • Debridement      This order was created via procedure documentation       Patito Kaiser MD

## 2024-07-02 NOTE — PROGRESS NOTES
Wound Procedure Treatment Traumatic Right Pretibial    Performed by: Dariel Harrington RN  Authorized by: Patito Kaiser MD    Associated wounds:   Wound 06/04/24 Traumatic Pretibial Right  Wound cleansed with:  NSS  Applied to periwound:  Moisture lotion  Applied Topical: Mupirocin ointment    Applied secondary dressing:  Gauze  Dressing secured with:  Gunner, Tape, Elastic tubular stocking and Size F

## 2024-07-02 NOTE — PATIENT INSTRUCTIONS
Orders Placed This Encounter   Procedures    Wound cleansing and dressings Traumatic Right Pretibial     Wash your hands with soap and water.  Remove old dressing, discard into plastic bag and place in trash.    Cleanse the wound with a mild, unscented soap (Dove) prior to applying a clean dressing. Do not use tissue or cotton balls.   Do not scrub the wound. Pat dry using gauze.     Avoid showering for the week  Do not leave wound open to air, apply new dressing immediately.     Apply Mupirocin to the R leg wound.   Cover with gauze or ABD pad  Secure with rolled gauze and tape and Spandigrip size F.  Change dressing every other day.     Obtain AmLactin moisturizer and apply to bilateral lower extremity daily   Watch for signs of infection (redness,warmth to the touch, increased pain, odor, pus, swelling, fever, chills, nausea, vomiting). If you notice any of these call the wound center or proceed to the ER.     Standing Status:   Future     Standing Expiration Date:   7/9/2024    Debridement     This order was created via procedure documentation

## 2024-07-08 NOTE — PROGRESS NOTES
"Patient ID: Hina Gutierrez is a 79 y.o. female Date of Birth 1945       Chief Complaint   Patient presents with   • Follow Up Wound Care Visit       Allergies:  Doxycycline and Penicillins    Diagnosis:      Diagnosis ICD-10-CM Associated Orders   1. Open wound of right lower extremity, initial encounter  S81.801A lidocaine (LMX) 4 % cream     Wound cleansing and dressings Traumatic Right Pretibial     Debridement     Wound Procedure Treatment Traumatic Right Pretibial              Assessment & Plan:  Traumatic open wound right lower extremity: Measurements are again slightly improved today.  Remains w/ two small open areas within single wound measurement.  Wound bed w/ granulation, hypergranulation tissue, slough and fibrin - most superior open area with mostly granulation and hypergranulation tissue.  The most distal of the open areas has a medial section with very small area of increased depth which represents deepest wound measurements.  No s/sx of acute soft tissue infection at this time.   Surgical debridement  Continue mupirocin ointment followed by gauze and double layer Spanda   ABIs completed in office today L 1.21 / R 1.58  Continue to recommend regular lower extremity elevation when resting and avoid prolonged periods of time with legs in dependent position  ER precautions reviewed, she expressed understanding  Follow-up in 1 week or sooner if needed    Portions of the record may have been created with voice recognition software. Occasional wrong word or \"sound alike\" substitutions may have occurred due to the inherent limitations of voice recognition software. Read the chart carefully and recognize, using context, where substitutions have occurred.    Subjective:   6/4/2024: Initial visit to wound center with Dr. Arellano.  Please see visit documentation    6/11/2024: Hina presented today along with her  for follow-up evaluation of traumatic wound right lower extremity.  At her last " wound visit, PolyMem Ag recommended to be used.  They reported that on some occasions the dressing did stick but easily removed after soaking with saline.  She has not had fever and/or chills.  No other new acute complaints.    06/18/2024: Hina presents today for follow-up of traumatic wound RLE.  She reports she feels her wound looks better and she has no new acute complaints today.  She denies fever, chills.    6/25/2024: Hina presents today for follow-up of traumatic wound right lower extremity.  She reports she has no new acute complaints today.  She denies fever, chills.  Does try to elevate her legs when she is resting.    7/2/2024: Hina presents today for f/u of RLE wound.  She reports she feels her wound is doing well.  States that she ran out of the PolyMem Ag so utilized the mupirocin ointment for her most recent dressing change.  She has not had fever and/or chills.  Continues to attempt to elevate her lower extremities most times when she is resting.    7/9/2024: Hina presents today for follow-up of right lower extremity wound.  She has no new acute complaints today.  She denies fever, chills.  Has been elevating her legs when resting.      The following portions of the patient's history were reviewed and updated as appropriate:   Patient Active Problem List   Diagnosis   • Chest pain in adult   • Acquired hypothyroidism   • Hyperlipidemia   • Hyperglycemia   • Leukocytosis     Past Medical History:   Diagnosis Date   • Anxiety    • Disease of thyroid gland    • Hypercholesterolemia    • Rosacea      Past Surgical History:   Procedure Laterality Date   • BACK SURGERY     • HYSTERECTOMY  1999     Family History   Problem Relation Age of Onset   • Heart disease Mother    • Heart disease Father    • No Known Problems Maternal Grandmother    • No Known Problems Maternal Grandfather    • Breast cancer Paternal Grandmother    • No Known Problems Paternal Grandfather    • No Known Problems Brother    • No  Known Problems Maternal Aunt    • No Known Problems Maternal Aunt       Social History     Socioeconomic History   • Marital status: /Civil Union     Spouse name: None   • Number of children: None   • Years of education: None   • Highest education level: None   Occupational History   • None   Tobacco Use   • Smoking status: Never   • Smokeless tobacco: Never   Vaping Use   • Vaping status: Never Used   Substance and Sexual Activity   • Alcohol use: Yes   • Drug use: Not Currently   • Sexual activity: None   Other Topics Concern   • None   Social History Narrative   • None     Social Determinants of Health     Financial Resource Strain: Low Risk  (3/20/2024)    Received from [facility]    Financial Resource Strain    • Do you have any trouble paying for your medications, or do you think you might in the future?: No    • Does your family have trouble paying for medicine? (Household - for ages 0-17 years): Not on file   Food Insecurity: No Food Insecurity (3/20/2024)    Received from [facility]    Food Insecurity    • Do you need food for this week?: No    • Are you able to get enough food for your family? (Household - for ages 0-17 years): Not on file    • Does your family need food this week? (Household - for ages 0-17 years): Not on file    • Do you always have enough food for your family? (Household - for ages 0-17 years): Not on file   Transportation Needs: No Transportation Needs (3/20/2024)    Received from [facility]    Transportation Needs    • READ ONLY Do you have trouble getting a ride to medical visits or work?: Never True    • Does your family have a hard time getting a ride to doctors’ visits? (Household - for ages 0-17 years): Not on file    • Has lack of transportation kept you from medical appointments, meetings, work, or from getting things needed for daily living? Check all that apply. (Adult - for ages 18 years and over): Not on file    • Do you (or your family) have trouble finding or paying  for a ride (transportation)? (Household - for ages 0-17 years): Not on file   Physical Activity: Not on file   Stress: Not on file   Social Connections: Socially Integrated (3/20/2024)    Received from [facility]    Social Connections    • How often do you feel lonely or isolated from those around you?: Never   Intimate Partner Violence: Unknown (6/4/2021)    [facility]    Intimate Partner Violence    • Within the last year, have you been afraid of your partner, ex-partner or family member?: Not on file    • Within the last year, have you been humiliated or emotionally abused in other ways by your partner, ex-partner or family member?: Not on file    • Within the last year, have you been kicked, hit, slapped, or otherwise physically hurt by your partner, ex-partner or family member?: Not on file    • Within the last year, have you been raped or forced to have any kind of sexual activity by your partner, ex-partner or family member?: Not on file   Housing Stability: Low Risk  (3/20/2024)    Received from [facility]    Housing Stability    • Do you currently live in a shelter or have no steady place to sleep at night?: No    • READ ONLY Do you think you are at risk of becoming homeless?: No    • Does your family worry about paying for your home or becoming homeless? (Household - for ages 0-17 years): Not on file    • Are you homeless or worried that you might be in the future? (Adult - for ages 18 years and over): Not on file    • Are you (or your family) homeless or worried that you might be in the future? (Household - for ages 0-17 years): Not on file        Current Outpatient Medications:   •  ALPRAZolam (XANAX) 0.25 mg tablet, Take by mouth (Patient not taking: Reported on 5/30/2024), Disp: , Rfl:   •  Azelaic Acid 15 % cream, Apply 1 Application topically in the morning, Disp: , Rfl:   •  Cholecalciferol (VITAMIN D3) 50 MCG (2000 UT) TABS, Take by mouth every  other day , Disp: , Rfl:   •  Coenzyme Q10 200 MG capsule, Take by mouth daily , Disp: , Rfl:   •  FLUoxetine (PROzac) 10 mg capsule, Take 10 mg by mouth daily, Disp: , Rfl:   •  levothyroxine (Synthroid) 75 mcg tablet, Take by mouth daily in the early morning , Disp: , Rfl:   •  Multiple Vitamin (MULTI-VITAMIN) tablet, Take by mouth, Disp: , Rfl:   •  mupirocin (BACTROBAN) 2 % ointment, Please apply topically to R leg wound with dressing changes, Disp: 22 g, Rfl: 0  •  Naproxen Sodium 220 MG CAPS, Take 220 mg by mouth 2 (two) times a day, Disp: , Rfl:   •  Omega-3 Fatty Acids ([employer] FISH OIL) 1200 MG CAPS, Take by mouth (Patient not taking: Reported on 6/4/2024), Disp: , Rfl:   •  simvastatin (ZOCOR) 40 mg tablet, Take by mouth daily at bedtime , Disp: , Rfl:   •  Turmeric Curcumin 500 MG CAPS, Take by mouth daily  (Patient not taking: Reported on 6/4/2024), Disp: , Rfl:   •  vitamin B-12 (VITAMIN B-12) 1,000 mcg tablet, Take by mouth daily, Disp: , Rfl:   No current facility-administered medications for this visit.    Review of Systems   Constitutional:  Negative for chills and fever.   Cardiovascular:  Negative for leg swelling.   Gastrointestinal:  Negative for vomiting.   Musculoskeletal:  Positive for gait problem (ambulates w/ cane).   Skin:  Positive for wound (RLE).   Psychiatric/Behavioral:  The patient is not nervous/anxious.        Objective:  /57   Pulse 81   Temp 97.8 °F (36.6 °C)   Resp 20         Physical Exam  Vitals and nursing note reviewed.   Constitutional:       General: She is not in acute distress.     Appearance: She is not ill-appearing, toxic-appearing or diaphoretic.   HENT:      Head: Normocephalic and atraumatic.   Eyes:      Conjunctiva/sclera: Conjunctivae normal.   Cardiovascular:      Rate and Rhythm: Normal rate.      Pulses:           Dorsalis pedis pulses are 2+ on the right side.        Posterior tibial pulses are 2+ on the right side.      Comments: Stable - b/l LE  edema, predominantly non pitting, around 1+  Pulmonary:      Effort: Pulmonary effort is normal. No respiratory distress.   Musculoskeletal:      Right lower le+ Edema present.      Left lower le+ Edema present.   Skin:     General: Skin is warm.      Findings: Wound present.             Comments: 1-  Measurements are again slightly improved today.  Remains w/ two small open areas within single wound measurement.  Wound bed w/ granulation, hypergranulation tissue, slough and fibrin - most superior open area with mostly granulation and hypergranulation tissue.  The most distal of the open areas has a medial section with very small area of increased depth which represents deepest wound measurements.  No purulent drainage, acute erythema, lymphangitic streaking, induration, fluctuance.    C/w evidence of venous stasis on bilateral lower extremities with scattered varicosities, areas of hyperpigmentation/hemosiderin deposits    See full wound description    Neurological:      Mental Status: She is alert and oriented to person, place, and time.      Gait: Gait abnormal (ambulates w/ cane).   Psychiatric:         Mood and Affect: Mood normal.         Behavior: Behavior normal.           Wound 24 Traumatic Pretibial Right (Active)   Wound Image   24 1341   Wound Description Yellow;Slough;Granulation tissue;Epithelialization 24 1342   Ashyln-wound Assessment Scar Tissue 24 1342   Wound Length (cm) 7.1 cm 24 1342   Wound Width (cm) 1.1 cm 24 1342   Wound Depth (cm) 0.3 cm 24 1342   Wound Surface Area (cm^2) 7.81 cm^2 24 1342   Wound Volume (cm^3) 2.343 cm^3 24 1342   Calculated Wound Volume (cm^3) 2.34 cm^3 24 1342   Change in Wound Size % 49.68 24 1342   Drainage Amount Moderate 24 1342   Drainage Description Serosanguineous 24 1342   Non-staged Wound Description Full thickness 24 1342   Treatments Cleansed 24 [id number]  "    Debridement    Universal Protocol:  Consent: Verbal consent obtained. Written consent obtained.  Risks and benefits: risks, benefits and alternatives were discussed  Consent given by: patient  Time out: Immediately prior to procedure a \"time out\" was called to verify the correct patient, procedure, equipment, support staff and site/side marked as required.  Patient understanding: patient states understanding of the procedure being performed  Patient identity confirmed: verbally with patient    Debridement Details  Performed by: physician  Debridement type: surgical  Level of debridement: subcutaneous tissue  Pain control: lidocaine 4%      Post-debridement measurements  Length (cm): 7.1  Width (cm): 1.1  Depth (cm): 0.4  Percent debrided: 35%  Surface Area (cm^2): 7.81  Area Debrided (cm^2): 2.73  Volume (cm^3): 3.12    Tissue and other material debrided: hypergranulation and subcutaneous tissue  Devitalized tissue debrided: fibrin and slough  Bleeding: medium  Hemostasis obtained with: pressure and silver nitrate  Response to treatment: procedure was tolerated well  Debridement Comments: Depth postdebridement represents deepest area on most distal open part of wound       Postdebridement photo unavailable          Lab Results   Component Value Date    HGBA1C 5.7 (H) 03/05/2024       Wound Instructions:  Orders Placed This Encounter   Procedures   • Wound cleansing and dressings Traumatic Right Pretibial     Wash your hands with soap and water.  Remove old dressing, discard into plastic bag and place in trash.    Cleanse the wound with a mild, unscented soap (Dove) prior to applying a clean dressing. Do not use tissue or cotton balls.   Do not scrub the wound. Pat dry using gauze.     Avoid showering for the week  Do not leave wound open to air, apply new dressing immediately.     Apply Mupirocin to the R leg wound.   Cover with gauze or ABD pad  Secure with rolled gauze and tape and a double layer of Spandigrip " size F if you can tolerate it.   Change dressing every other day.     Continue AmLactin moisturizer and apply to bilateral lower extremity daily   Watch for signs of infection (redness,warmth to the touch, increased pain, odor, pus, swelling, fever, chills, nausea, vomiting). If you notice any of these call the wound center or proceed to the ER.    Elastic Tubular Stocking-Spandagrip Size F    Tubular elastic bandage: Apply from base of toes to behind the knee. Apply in AM, may remove for sleep.  Avoid prolonged standing in one place.  Elevate leg(s) above the level of the heart when sitting or as much as possible.     Standing Status:   Future     Standing Expiration Date:   7/16/2024   • Debridement     This order was created via procedure documentation   • Wound Procedure Treatment Traumatic Right Pretibial     This order was created via procedure documentation         Patito Kaiser MD

## 2024-07-09 ENCOUNTER — OFFICE VISIT (OUTPATIENT)
Facility: CLINIC | Age: 79
End: 2024-07-09
Payer: MEDICARE

## 2024-07-09 VITALS
RESPIRATION RATE: 20 BRPM | TEMPERATURE: 97.8 F | DIASTOLIC BLOOD PRESSURE: 57 MMHG | SYSTOLIC BLOOD PRESSURE: 110 MMHG | HEART RATE: 81 BPM

## 2024-07-09 DIAGNOSIS — S81.801A OPEN WOUND OF RIGHT LOWER EXTREMITY, INITIAL ENCOUNTER: Primary | ICD-10-CM

## 2024-07-09 PROCEDURE — 11042 DBRDMT SUBQ TIS 1ST 20SQCM/<: CPT | Performed by: FAMILY MEDICINE

## 2024-07-09 RX ORDER — LIDOCAINE 40 MG/G
CREAM TOPICAL ONCE
Status: COMPLETED | OUTPATIENT
Start: 2024-07-09 | End: 2024-07-09

## 2024-07-09 RX ADMIN — LIDOCAINE: 40 CREAM TOPICAL at 13:45

## 2024-07-09 NOTE — PROGRESS NOTES
Wound Procedure Treatment Traumatic Right Pretibial    Performed by: Trisha Cabral RN  Authorized by: Patito Kaiser MD    Associated wounds:   Wound 06/04/24 Traumatic Pretibial Right  Wound cleansed with:  NSS  Applied Topical: Mupirocin ointment    Applied secondary dressing:  ABD and Gauze  Dressing secured with:  Gunner, Tape, Elastic tubular stocking and Size F  Comments:  Double layer of Spandagrip size F applied to patient.

## 2024-07-09 NOTE — PATIENT INSTRUCTIONS
Orders Placed This Encounter   Procedures    Wound cleansing and dressings Traumatic Right Pretibial     Wash your hands with soap and water.  Remove old dressing, discard into plastic bag and place in trash.    Cleanse the wound with a mild, unscented soap (Dove) prior to applying a clean dressing. Do not use tissue or cotton balls.   Do not scrub the wound. Pat dry using gauze.     Avoid showering for the week  Do not leave wound open to air, apply new dressing immediately.     Apply Mupirocin to the R leg wound.   Cover with gauze or ABD pad  Secure with rolled gauze and tape and a double layer of Spandigrip size F if you can tolerate it.   Change dressing every other day.     Continue AmLactin moisturizer and apply to bilateral lower extremity daily   Watch for signs of infection (redness,warmth to the touch, increased pain, odor, pus, swelling, fever, chills, nausea, vomiting). If you notice any of these call the wound center or proceed to the ER.    Elastic Tubular Stocking-Spandagrip Size F    Tubular elastic bandage: Apply from base of toes to behind the knee. Apply in AM, may remove for sleep.  Avoid prolonged standing in one place.  Elevate leg(s) above the level of the heart when sitting or as much as possible.     Standing Status:   Future     Standing Expiration Date:   7/16/2024

## 2024-07-16 ENCOUNTER — OFFICE VISIT (OUTPATIENT)
Facility: CLINIC | Age: 79
End: 2024-07-16
Payer: MEDICARE

## 2024-07-16 VITALS
HEART RATE: 70 BPM | TEMPERATURE: 99.1 F | DIASTOLIC BLOOD PRESSURE: 66 MMHG | RESPIRATION RATE: 18 BRPM | SYSTOLIC BLOOD PRESSURE: 126 MMHG

## 2024-07-16 DIAGNOSIS — S81.801A OPEN WOUND OF RIGHT LOWER EXTREMITY, INITIAL ENCOUNTER: Primary | ICD-10-CM

## 2024-07-16 DIAGNOSIS — Z00.6 ENCOUNTER FOR EXAMINATION FOR NORMAL COMPARISON OR CONTROL IN CLINICAL RESEARCH PROGRAM: ICD-10-CM

## 2024-07-16 PROCEDURE — 11042 DBRDMT SUBQ TIS 1ST 20SQCM/<: CPT | Performed by: FAMILY MEDICINE

## 2024-07-16 RX ORDER — LIDOCAINE HYDROCHLORIDE 40 MG/ML
5 SOLUTION TOPICAL ONCE
Status: COMPLETED | OUTPATIENT
Start: 2024-07-16 | End: 2024-07-16

## 2024-07-16 RX ADMIN — LIDOCAINE HYDROCHLORIDE 5 ML: 40 SOLUTION TOPICAL at 11:14

## 2024-07-16 NOTE — PROGRESS NOTES
Wound Procedure Treatment Traumatic Right Pretibial    Performed by: Dariel Harrington RN  Authorized by: Patito Kaiser MD    Associated wounds:   Wound 06/04/24 Traumatic Pretibial Right  Wound cleansed with:  NSS  Applied Topical: Mupirocin ointment    Applied primary dressing:  Collagen dressing  Applied secondary dressing:  Gauze  Dressing secured with:  Elastic tubular stocking, Size F, Gunner and Tape    Double layer size f

## 2024-07-16 NOTE — PROGRESS NOTES
"Patient ID: Hina Gutierrez is a 79 y.o. female Date of Birth 1945       Chief Complaint   Patient presents with   • Follow Up Wound Care Visit     RLE       Allergies:  Doxycycline and Penicillins    Diagnosis:      Diagnosis ICD-10-CM Associated Orders   1. Open wound of right lower extremity, initial encounter  S81.801A lidocaine (XYLOCAINE) 4 % topical solution 5 mL     Wound cleansing and dressings Traumatic Right Pretibial     Debridement     Wound Procedure Treatment Traumatic Right Pretibial              Assessment & Plan:  Traumatic open wound right lower extremity: Measurements are improved - two small open areas measured as one wound. remains with small area at medial portion of distal wound with increased depth w/ fibrin/slough/necrotic debris within requiring surgical debridement -this area represents deepest wound measurement. Otherwise, remainder of the open areas w/ pink wound bed with some surrounding hyperkeratotic debris mechanically debrided with gauze.  No s/sx of acute soft tissue infection at this time.  Surgical debridement  Continue mupirocin ointment followed by gauze to all open areas except the small area where surgical debridement was completed.  In this area, recommended moistened endoform  Continue regular lower extremity elevation when resting and avoid prolonged period of time with legs in dependent position  See below wound care orders for full details  ER precautions again reviewed, she expressed understanding  F/u 1 week or sooner if needed    Portions of the record may have been created with voice recognition software. Occasional wrong word or \"sound alike\" substitutions may have occurred due to the inherent limitations of voice recognition software. Read the chart carefully and recognize, using context, where substitutions have occurred.    Subjective:   6/4/2024: Initial visit to wound center with Dr. Arellano.  Please see visit documentation    6/11/2024: Hina " presented today along with her  for follow-up evaluation of traumatic wound right lower extremity.  At her last wound visit, PolyMem Ag recommended to be used.  They reported that on some occasions the dressing did stick but easily removed after soaking with saline.  She has not had fever and/or chills.  No other new acute complaints.    06/18/2024: Hina presents today for follow-up of traumatic wound RLE.  She reports she feels her wound looks better and she has no new acute complaints today.  She denies fever, chills.    6/25/2024: Hina presents today for follow-up of traumatic wound right lower extremity.  She reports she has no new acute complaints today.  She denies fever, chills.  Does try to elevate her legs when she is resting.    7/2/2024: Hina presents today for f/u of RLE wound.  She reports she feels her wound is doing well.  States that she ran out of the PolyMem Ag so utilized the mupirocin ointment for her most recent dressing change.  She has not had fever and/or chills.  Continues to attempt to elevate her lower extremities most times when she is resting.    7/9/2024: Hina presents today for follow-up of right lower extremity wound.  She has no new acute complaints today.  She denies fever, chills.  Has been elevating her legs when resting.    7/16/2024: Hina presents today for follow-up of her right leg wound.  She reports she is doing well and is pleased with the progress.  She is utilize a double layer Tubigrip's without difficulty.  She denies fever, chills.  Continues to elevate her legs.        The following portions of the patient's history were reviewed and updated as appropriate:   Patient Active Problem List   Diagnosis   • Chest pain in adult   • Acquired hypothyroidism   • Hyperlipidemia   • Hyperglycemia   • Leukocytosis     Past Medical History:   Diagnosis Date   • Anxiety    • Disease of thyroid gland    • Hypercholesterolemia    • Rosacea      Past Surgical History:    Procedure Laterality Date   • BACK SURGERY     • HYSTERECTOMY  1999     Family History   Problem Relation Age of Onset   • Heart disease Mother    • Heart disease Father    • No Known Problems Maternal Grandmother    • No Known Problems Maternal Grandfather    • Breast cancer Paternal Grandmother    • No Known Problems Paternal Grandfather    • No Known Problems Brother    • No Known Problems Maternal Aunt    • No Known Problems Maternal Aunt       Social History     Socioeconomic History   • Marital status: /Civil Union     Spouse name: None   • Number of children: None   • Years of education: None   • Highest education level: None   Occupational History   • None   Tobacco Use   • Smoking status: Never   • Smokeless tobacco: Never   Vaping Use   • Vaping status: Never Used   Substance and Sexual Activity   • Alcohol use: Yes   • Drug use: Not Currently   • Sexual activity: None   Other Topics Concern   • None   Social History Narrative   • None     Social Determinants of Health     Financial Resource Strain: Low Risk  (3/20/2024)    Received from [facility]    Financial Resource Strain    • Do you have any trouble paying for your medications, or do you think you might in the future?: No    • Does your family have trouble paying for medicine? (Household - for ages 0-17 years): Not on file   Food Insecurity: No Food Insecurity (3/20/2024)    Received from [facility]    Food Insecurity    • Do you need food for this week?: No    • Are you able to get enough food for your family? (Household - for ages 0-17 years): Not on file    • Does your family need food this week? (Household - for ages 0-17 years): Not on file    • Do you always have enough food for your family? (Household - for ages 0-17 years): Not on file   Transportation Needs: No Transportation Needs (3/20/2024)    Received from [facility]    Transportation Needs    • READ ONLY Do you have trouble getting a ride to medical visits or work?: Never True     • Does your family have a hard time getting a ride to doctors’ visits? (Household - for ages 0-17 years): Not on file    • Has lack of transportation kept you from medical appointments, meetings, work, or from getting things needed for daily living? Check all that apply. (Adult - for ages 18 years and over): Not on file    • Do you (or your family) have trouble finding or paying for a ride (transportation)? (Household - for ages 0-17 years): Not on file   Physical Activity: Not on file   Stress: Not on file   Social Connections: Socially Integrated (3/20/2024)    Received from [facility] [employer] [facility]    Social Fashion Playtes    • How often do you feel lonely or isolated from those around you?: Never   Intimate Partner Violence: Unknown (6/4/2021)    [facility], [facility]    Intimate Partner Violence    • Within the last year, have you been afraid of your partner, ex-partner or family member?: Not on file    • Within the last year, have you been humiliated or emotionally abused in other ways by your partner, ex-partner or family member?: Not on file    • Within the last year, have you been kicked, hit, slapped, or otherwise physically hurt by your partner, ex-partner or family member?: Not on file    • Within the last year, have you been raped or forced to have any kind of sexual activity by your partner, ex-partner or family member?: Not on file   Housing Stability: Low Risk  (3/20/2024)    Received from [facility] [facility]    Housing Stability    • Do you currently live in a shelter or have no steady place to sleep at night?: No    • READ ONLY Do you think you are at risk of becoming homeless?: No    • Does your family worry about paying for your home or becoming homeless? (Household - for ages 0-17 years): Not on file    • Are you homeless or worried that you might be in the future? (Adult - for ages 18 years and over): Not on file    • Are you (or your family) homeless or worried that you might be  in the future? (Household - for ages 0-17 years): Not on file        Current Outpatient Medications:   •  ALPRAZolam (XANAX) 0.25 mg tablet, Take by mouth (Patient not taking: Reported on 5/30/2024), Disp: , Rfl:   •  Azelaic Acid 15 % cream, Apply 1 Application topically in the morning, Disp: , Rfl:   •  Cholecalciferol (VITAMIN D3) 50 MCG (2000 UT) TABS, Take by mouth every other day , Disp: , Rfl:   •  Coenzyme Q10 200 MG capsule, Take by mouth daily , Disp: , Rfl:   •  FLUoxetine (PROzac) 10 mg capsule, Take 10 mg by mouth daily, Disp: , Rfl:   •  levothyroxine (Synthroid) 75 mcg tablet, Take by mouth daily in the early morning , Disp: , Rfl:   •  Multiple Vitamin (MULTI-VITAMIN) tablet, Take by mouth, Disp: , Rfl:   •  mupirocin (BACTROBAN) 2 % ointment, Please apply topically to R leg wound with dressing changes, Disp: 22 g, Rfl: 0  •  Naproxen Sodium 220 MG CAPS, Take 220 mg by mouth 2 (two) times a day, Disp: , Rfl:   •  Omega-3 Fatty Acids ([employer] FISH OIL) 1200 MG CAPS, Take by mouth (Patient not taking: Reported on 6/4/2024), Disp: , Rfl:   •  simvastatin (ZOCOR) 40 mg tablet, Take by mouth daily at bedtime , Disp: , Rfl:   •  Turmeric Curcumin 500 MG CAPS, Take by mouth daily  (Patient not taking: Reported on 6/4/2024), Disp: , Rfl:   •  vitamin B-12 (VITAMIN B-12) 1,000 mcg tablet, Take by mouth daily, Disp: , Rfl:   No current facility-administered medications for this visit.    Review of Systems   Constitutional:  Negative for chills and fever.   Cardiovascular:  Negative for leg swelling.   Musculoskeletal:  Positive for gait problem (ambulates w/ cane).   Skin:  Positive for wound (RLE).   Psychiatric/Behavioral:  The patient is not nervous/anxious.        Objective:  /66   Pulse 70   Temp 99.1 °F (37.3 °C)   Resp 18         Physical Exam  Vitals and nursing note reviewed.   Constitutional:       General: She is not in acute distress.     Appearance: She is not ill-appearing, toxic-appearing  or diaphoretic.   HENT:      Head: Normocephalic and atraumatic.   Eyes:      General: No scleral icterus.  Cardiovascular:      Rate and Rhythm: Normal rate.      Pulses:           Dorsalis pedis pulses are 2+ on the right side.        Posterior tibial pulses are 2+ on the right side.      Comments: Stable - b/l LE edema, predominantly non pitting, around 1+  Pulmonary:      Effort: Pulmonary effort is normal. No respiratory distress.   Musculoskeletal:      Right lower le+ Edema present.      Left lower le+ Edema present.   Skin:     General: Skin is warm.      Findings: Wound present.             Comments: 1-  Measurements are improved - two small open areas measured as one wound. remains with small area at medial portion of distal wound with increased depth w/ fibrin/slough/necrotic debris within requiring surgical debridement -this area represents deepest wound measurement. Otherwise, remainder of the open areas w/ pink wound bed with some surrounding hyperkeratotic debris mechanically debrided with gauze.  No purulent drainage, acute erythema, lymphangitic streaking, induration, fluctuance.    C/w evidence of venous stasis on bilateral lower extremities with scattered varicosities, areas of hyperpigmentation/hemosiderin deposits    See full wound description    Neurological:      Mental Status: She is alert and oriented to person, place, and time.      Gait: Gait abnormal (ambulates w/ cane).   Psychiatric:         Mood and Affect: Mood normal.         Behavior: Behavior normal.           Wound 24 Traumatic Pretibial Right (Active)   Wound Image   24 1109   Wound Description Epithelialization;Granulation tissue;Pink 24 1110   Ashlyn-wound Assessment Scar Tissue 24 1110   Wound Length (cm) 7.2 cm 24 1110   Wound Width (cm) 0.5 cm 24 1110   Wound Depth (cm) 0.3 cm 24 1110   Wound Surface Area (cm^2) 3.6 cm^2 24 1110   Wound Volume (cm^3) 1.08 cm^3 24  "1110   Calculated Wound Volume (cm^3) 1.08 cm^3 07/16/24 1110   Change in Wound Size % 76.77 07/16/24 1110   Drainage Amount Small 07/16/24 1110   Drainage Description Yellow;Serosanguineous 07/16/24 1110   Non-staged Wound Description Full thickness 07/16/24 1110   Treatments Cleansed 06/18/24 1059     Debridement    Universal Protocol:  Consent: Verbal consent obtained. Written consent obtained.  Risks and benefits: risks, benefits and alternatives were discussed  Consent given by: patient  Time out: Immediately prior to procedure a \"time out\" was called to verify the correct patient, procedure, equipment, support staff and site/side marked as required.  Patient understanding: patient states understanding of the procedure being performed  Patient identity confirmed: verbally with patient    Debridement Details  Performed by: physician  Debridement type: surgical  Level of debridement: subcutaneous tissue  Pain control: lidocaine 4%      Post-debridement measurements  Length (cm): 7.2  Width (cm): 0.5  Depth (cm): 0.5  Percent debrided: 25%  Surface Area (cm^2): 3.6  Area Debrided (cm^2): 0.9  Volume (cm^3): 1.8    Tissue and other material debrided: subcutaneous tissue  Devitalized tissue debrided: fibrin, necrotic debris and slough  Instrument(s) utilized: blade  Bleeding: medium  Hemostasis obtained with: pressure  Procedural pain (0-10): 0  Post-procedural pain: 0   Response to treatment: procedure was tolerated well                 Lab Results   Component Value Date    HGBA1C 5.7 (H) 03/05/2024       Wound Instructions:  Orders Placed This Encounter   Procedures   • Wound cleansing and dressings Traumatic Right Pretibial     Wash your hands with soap and water.  Remove old dressing, discard into plastic bag and place in trash.    Cleanse the wound with a mild, unscented soap (Dove) prior to applying a clean dressing. Do not use tissue or cotton balls.   Do not scrub the wound. Pat dry using gauze.     Avoid " showering for the week  Do not leave wound open to air, apply new dressing immediately.    Cleanse the wound with prophase  Apply Endoform to the deepest area of the wound and Mupirocin to the rest R leg wound.   Cover with gauze or ABD pad  Secure with rolled gauze and tape and a double layer of Spandigrip size F if you can tolerate it.   Change dressing every 2 days.     Continue AmLactin moisturizer and apply to bilateral lower extremity daily   Watch for signs of infection (redness,warmth to the touch, increased pain, odor, pus, swelling, fever, chills, nausea, vomiting). If you notice any of these call the wound center or proceed to the ER.     Elastic Tubular Stocking-Spandagrip Size F     Tubular elastic bandage: Apply from base of toes to behind the knee. Apply in AM, may remove for sleep.  Avoid prolonged standing in one place.  Elevate leg(s) above the level of the heart when sitting or as much as possible     Standing Status:   Future     Standing Expiration Date:   7/23/2024   • Debridement     This order was created via procedure documentation   • Wound Procedure Treatment Traumatic Right Pretibial     This order was created via procedure documentation       Patito Kaiser MD

## 2024-07-16 NOTE — PATIENT INSTRUCTIONS
Orders Placed This Encounter   Procedures    Wound cleansing and dressings Traumatic Right Pretibial     Wash your hands with soap and water.  Remove old dressing, discard into plastic bag and place in trash.    Cleanse the wound with a mild, unscented soap (Dove) prior to applying a clean dressing. Do not use tissue or cotton balls.   Do not scrub the wound. Pat dry using gauze.     Avoid showering for the week  Do not leave wound open to air, apply new dressing immediately.    Cleanse the wound with prophase  Apply Endoform to the deepest area of the wound and Mupirocin to the rest R leg wound.   Cover with gauze or ABD pad  Secure with rolled gauze and tape and a double layer of Spandigrip size F if you can tolerate it.   Change dressing every 2 days.     Continue AmLactin moisturizer and apply to bilateral lower extremity daily   Watch for signs of infection (redness,warmth to the touch, increased pain, odor, pus, swelling, fever, chills, nausea, vomiting). If you notice any of these call the wound center or proceed to the ER.     Elastic Tubular Stocking-Spandagrip Size F     Tubular elastic bandage: Apply from base of toes to behind the knee. Apply in AM, may remove for sleep.  Avoid prolonged standing in one place.  Elevate leg(s) above the level of the heart when sitting or as much as possible     Standing Status:   Future     Standing Expiration Date:   7/23/2024

## 2024-07-23 ENCOUNTER — OFFICE VISIT (OUTPATIENT)
Facility: CLINIC | Age: 79
End: 2024-07-23
Payer: MEDICARE

## 2024-07-23 VITALS
SYSTOLIC BLOOD PRESSURE: 129 MMHG | TEMPERATURE: 98.1 F | HEART RATE: 71 BPM | RESPIRATION RATE: 16 BRPM | DIASTOLIC BLOOD PRESSURE: 72 MMHG

## 2024-07-23 DIAGNOSIS — S81.801A OPEN WOUND OF RIGHT LOWER EXTREMITY, INITIAL ENCOUNTER: Primary | ICD-10-CM

## 2024-07-23 PROCEDURE — 11042 DBRDMT SUBQ TIS 1ST 20SQCM/<: CPT | Performed by: FAMILY MEDICINE

## 2024-07-23 RX ORDER — LIDOCAINE 40 MG/G
CREAM TOPICAL ONCE
Status: COMPLETED | OUTPATIENT
Start: 2024-07-23 | End: 2024-07-23

## 2024-07-23 RX ADMIN — LIDOCAINE: 40 CREAM TOPICAL at 10:59

## 2024-07-23 NOTE — PROGRESS NOTES
"Patient ID: Hina Gutierrez is a 79 y.o. female Date of Birth 1945       Chief Complaint   Patient presents with   • Follow Up Wound Care Visit     RLE wound       Allergies:  Doxycycline and Penicillins    Diagnosis:      Diagnosis ICD-10-CM Associated Orders   1. Open wound of right lower extremity, initial encounter  S81.801A lidocaine (LMX) 4 % cream     Wound cleansing and dressings Traumatic Right Pretibial     Debridement     Wound Procedure Treatment Traumatic Right Pretibial              Assessment & Plan:  Traumatic open wound right lower extremity: Measurements improved. Proximal area has closed.  Remains with a very small open area at the distal wound with fibrin/slough/necrotic debris requiring surgical debridement.  Overall, wound has made significant improvement over the past few weeks.  She remains without signs of infection.  Surgical debridement  Continue endoform to the remaining open area followed by periwound mupirocin.  Reviewed that should she notice the endoform is not dissolving, she can discontinue its use and utilize mupirocin ointment only.   Continue Tubigrip  Continue regular lower extremity elevation when resting and avoid prolonged periods of time with legs in dependent position  ER precautions again reviewed, she expressed understanding  See below wound care orders for full details  Follow-up in 1 week or sooner if needed      Portions of the record may have been created with voice recognition software. Occasional wrong word or \"sound alike\" substitutions may have occurred due to the inherent limitations of voice recognition software. Read the chart carefully and recognize, using context, where substitutions have occurred.    Subjective:   6/4/2024: Initial visit to wound center with Dr. Arellano.  Please see visit documentation    6/11/2024: Hina presented today along with her  for follow-up evaluation of traumatic wound right lower extremity.  At her last wound " visit, PolyMem Ag recommended to be used.  They reported that on some occasions the dressing did stick but easily removed after soaking with saline.  She has not had fever and/or chills.  No other new acute complaints.    06/18/2024: Hina presents today for follow-up of traumatic wound RLE.  She reports she feels her wound looks better and she has no new acute complaints today.  She denies fever, chills.    6/25/2024: Hina presents today for follow-up of traumatic wound right lower extremity.  She reports she has no new acute complaints today.  She denies fever, chills.  Does try to elevate her legs when she is resting.    7/2/2024: Hina presents today for f/u of RLE wound.  She reports she feels her wound is doing well.  States that she ran out of the PolyMem Ag so utilized the mupirocin ointment for her most recent dressing change.  She has not had fever and/or chills.  Continues to attempt to elevate her lower extremities most times when she is resting.    7/9/2024: Hina presents today for follow-up of right lower extremity wound.  She has no new acute complaints today.  She denies fever, chills.  Has been elevating her legs when resting.    7/16/2024: Hina presents today for follow-up of her right leg wound.  She reports she is doing well and is pleased with the progress.  She is utilize a double layer Tubigrip's without difficulty.  She denies fever, chills.  Continues to elevate her legs.    7/23/2024: Hina presents today for f/u of right leg wound.  She reports that she was last here she had been using mupirocin ointment only.  Appears there was a bit of confusion regarding wound care instructions which we reviewed today.  She denies fever, chills.  Reports she has been using the Tubigrip and elevating her legs.  States she is very pleased with the progress of her wound.         The following portions of the patient's history were reviewed and updated as appropriate:   Patient Active Problem List    Diagnosis   • Chest pain in adult   • Acquired hypothyroidism   • Hyperlipidemia   • Hyperglycemia   • Leukocytosis     Past Medical History:   Diagnosis Date   • Anxiety    • Disease of thyroid gland    • Hypercholesterolemia    • Rosacea      Past Surgical History:   Procedure Laterality Date   • BACK SURGERY     • HYSTERECTOMY  1999     Family History   Problem Relation Age of Onset   • Heart disease Mother    • Heart disease Father    • No Known Problems Maternal Grandmother    • No Known Problems Maternal Grandfather    • Breast cancer Paternal Grandmother    • No Known Problems Paternal Grandfather    • No Known Problems Brother    • No Known Problems Maternal Aunt    • No Known Problems Maternal Aunt       Social History     Socioeconomic History   • Marital status: /Civil Union     Spouse name: None   • Number of children: None   • Years of education: None   • Highest education level: None   Occupational History   • None   Tobacco Use   • Smoking status: Never   • Smokeless tobacco: Never   Vaping Use   • Vaping status: Never Used   Substance and Sexual Activity   • Alcohol use: Yes   • Drug use: Not Currently   • Sexual activity: None   Other Topics Concern   • None   Social History Narrative   • None     Social Determinants of Health     Financial Resource Strain: Low Risk  (3/20/2024)    Received from [facility]    Financial Resource Strain    • Do you have any trouble paying for your medications, or do you think you might in the future?: No    • Does your family have trouble paying for medicine? (Household - for ages 0-17 years): Not on file   Food Insecurity: No Food Insecurity (3/20/2024)    Received from [facility]    Food Insecurity    • Do you need food for this week?: No    • Are you able to get enough food for your family? (Household - for ages 0-17 years): Not on file    • Does your family need food this week? (Household - for ages 0-17 years): Not on file    • Do you always have enough  food for your family? (Household - for ages 0-17 years): Not on file   Transportation Needs: No Transportation Needs (3/20/2024)    Received from [facility]    Transportation Needs    • READ ONLY Do you have trouble getting a ride to medical visits or work?: Never True    • Does your family have a hard time getting a ride to doctors’ visits? (Household - for ages 0-17 years): Not on file    • Has lack of transportation kept you from medical appointments, meetings, work, or from getting things needed for daily living? Check all that apply. (Adult - for ages 18 years and over): Not on file    • Do you (or your family) have trouble finding or paying for a ride (transportation)? (Household - for ages 0-17 years): Not on file   Physical Activity: Not on file   Stress: Not on file   Social Connections: Socially Integrated (3/20/2024)    Received from [facility]    Social Connections    • How often do you feel lonely or isolated from those around you?: Never   Intimate Partner Violence: Unknown (6/4/2021)    [facility]    Intimate Partner Violence    • Within the last year, have you been afraid of your partner, ex-partner or family member?: Not on file    • Within the last year, have you been humiliated or emotionally abused in other ways by your partner, ex-partner or family member?: Not on file    • Within the last year, have you been kicked, hit, slapped, or otherwise physically hurt by your partner, ex-partner or family member?: Not on file    • Within the last year, have you been raped or forced to have any kind of sexual activity by your partner, ex-partner or family member?: Not on file   Housing Stability: Low Risk  (3/20/2024)    Received from [facility]    Housing Stability    • Do you currently live in a shelter or have no steady place to sleep at night?: No    • READ ONLY Do you think you are at risk of becoming homeless?: No    • Does your family worry about  paying for your home or becoming homeless? (Household - for ages 0-17 years): Not on file    • Are you homeless or worried that you might be in the future? (Adult - for ages 18 years and over): Not on file    • Are you (or your family) homeless or worried that you might be in the future? (Household - for ages 0-17 years): Not on file        Current Outpatient Medications:   •  ALPRAZolam (XANAX) 0.25 mg tablet, Take by mouth (Patient not taking: Reported on 5/30/2024), Disp: , Rfl:   •  Azelaic Acid 15 % cream, Apply 1 Application topically in the morning, Disp: , Rfl:   •  Cholecalciferol (VITAMIN D3) 50 MCG (2000 UT) TABS, Take by mouth every other day , Disp: , Rfl:   •  Coenzyme Q10 200 MG capsule, Take by mouth daily , Disp: , Rfl:   •  FLUoxetine (PROzac) 10 mg capsule, Take 10 mg by mouth daily, Disp: , Rfl:   •  levothyroxine (Synthroid) 75 mcg tablet, Take by mouth daily in the early morning , Disp: , Rfl:   •  Multiple Vitamin (MULTI-VITAMIN) tablet, Take by mouth, Disp: , Rfl:   •  mupirocin (BACTROBAN) 2 % ointment, Please apply topically to R leg wound with dressing changes, Disp: 22 g, Rfl: 0  •  Naproxen Sodium 220 MG CAPS, Take 220 mg by mouth 2 (two) times a day, Disp: , Rfl:   •  Omega-3 Fatty Acids ([employer] FISH OIL) 1200 MG CAPS, Take by mouth (Patient not taking: Reported on 6/4/2024), Disp: , Rfl:   •  simvastatin (ZOCOR) 40 mg tablet, Take by mouth daily at bedtime , Disp: , Rfl:   •  Turmeric Curcumin 500 MG CAPS, Take by mouth daily  (Patient not taking: Reported on 6/4/2024), Disp: , Rfl:   •  vitamin B-12 (VITAMIN B-12) 1,000 mcg tablet, Take by mouth daily, Disp: , Rfl:   No current facility-administered medications for this visit.    Review of Systems   Constitutional:  Negative for chills and fever.   Cardiovascular:  Negative for leg swelling.   Musculoskeletal:  Positive for gait problem (ambulates w/ cane).   Skin:  Positive for wound (RLE).   Psychiatric/Behavioral:  The patient is not  nervous/anxious.        Objective:  /72   Pulse 71   Temp 98.1 °F (36.7 °C)   Resp 16         Physical Exam  Vitals and nursing note reviewed.   Constitutional:       General: She is not in acute distress.     Appearance: She is not ill-appearing, toxic-appearing or diaphoretic.   HENT:      Head: Normocephalic and atraumatic.   Eyes:      General: No scleral icterus.  Cardiovascular:      Rate and Rhythm: Normal rate.      Pulses:           Dorsalis pedis pulses are 2+ on the right side.        Posterior tibial pulses are 2+ on the right side.      Comments: Stable - b/l LE edema, predominantly non pitting, around 1+  Pulmonary:      Effort: Pulmonary effort is normal. No respiratory distress.   Musculoskeletal:      Right lower le+ Edema present.      Left lower le+ Edema present.   Skin:     General: Skin is warm.      Findings: Wound present.             Comments: 1-   Measurements improved. Proximal area has closed.  Remains with a very small open area at the distal wound with fibrin/slough/necrotic debris requiring surgical debridement.  Overall, wound has made significant improvement over the past few weeks.  No purulent drainage, acute erythema, lymphangitic streaking, induration, fluctuance.    C/w evidence of venous stasis on bilateral lower extremities with scattered varicosities, areas of hyperpigmentation/hemosiderin deposits    See full wound description    Neurological:      Mental Status: She is alert and oriented to person, place, and time.      Gait: Gait abnormal (ambulates w/ cane).   Psychiatric:         Mood and Affect: Mood normal.         Behavior: Behavior normal.         Wound 24 Traumatic Pretibial Right (Active)   Wound Image   24 1052   Wound Description Epithelialization;Pink 24 1052   Ashlyn-wound Assessment Scar Tissue 24 1052   Wound Length (cm) 0.3 cm 24 1052   Wound Width (cm) 0.3 cm 24 1052   Wound Depth (cm) 0.1 cm 24 1052  "  Wound Surface Area (cm^2) 0.09 cm^2 07/23/24 1052   Wound Volume (cm^3) 0.009 cm^3 07/23/24 1052   Calculated Wound Volume (cm^3) 0.01 cm^3 07/23/24 1052   Change in Wound Size % 99.78 07/23/24 1052   Drainage Amount Scant 07/23/24 1052   Drainage Description Serosanguineous;Yellow 07/23/24 1052   Non-staged Wound Description Full thickness 07/23/24 1052   Treatments Cleansed 06/18/24 1059     Debridement    Universal Protocol:  Consent: Verbal consent obtained. Written consent obtained.  Risks and benefits: risks, benefits and alternatives were discussed  Consent given by: patient  Time out: Immediately prior to procedure a \"time out\" was called to verify the correct patient, procedure, equipment, support staff and site/side marked as required.  Patient understanding: patient states understanding of the procedure being performed  Patient identity confirmed: verbally with patient    Debridement Details  Performed by: physician  Debridement type: surgical  Level of debridement: subcutaneous tissue  Pain control: lidocaine 4%      Post-debridement measurements  Length (cm): 0.3  Width (cm): 0.3  Depth (cm): 0.3  Percent debrided: 100%  Surface Area (cm^2): 0.09  Area Debrided (cm^2): 0.09  Volume (cm^3): 0.03    Tissue and other material debrided: subcutaneous tissue  Devitalized tissue debrided: fibrin, necrotic debris, slough and eschar  Instrument(s) utilized: blade  Bleeding: medium  Hemostasis obtained with: pressure  Procedural pain (0-10): 0  Post-procedural pain: 0   Response to treatment: procedure was tolerated well               Lab Results   Component Value Date    HGBA1C 5.7 (H) 03/05/2024       Wound Instructions:  Orders Placed This Encounter   Procedures   • Wound cleansing and dressings Traumatic Right Pretibial     Wash your hands with soap and water.  Remove old dressing, discard into plastic bag and place in trash.    Cleanse the wound with a mild, unscented soap (Dove) prior to applying a clean " dressing. Do not use tissue or cotton balls.   Do not scrub the wound. Pat dry using gauze.     Avoid showering for the week  Do not leave wound open to air, apply new dressing immediately.     Cleanse the wound with prophase  Apply Endoform ( moisten with saline) to the deepest area of the wound and Mupirocin to the rest R leg wound.   If you notice that the Endoform is not dissolving then switch to the Mupirocin.  Cover with gauze or ABD pad  Secure with rolled gauze and tape and a double layer of Spandigrip size F if you can tolerate it.   Change dressing every 2 days.     Continue AmLactin moisturizer and apply to bilateral lower extremity daily   Watch for signs of infection (redness,warmth to the touch, increased pain, odor, pus, swelling, fever, chills, nausea, vomiting). If you notice any of these call the wound center or proceed to the ER.     Elastic Tubular Stocking-Spandagrip Size F     Tubular elastic bandage: Apply from base of toes to behind the knee. Apply in AM, may remove for sleep.  Avoid prolonged standing in one place.  Elevate leg(s) above the level of the heart when sitting or as much as possible     Standing Status:   Future     Standing Expiration Date:   7/30/2024   • Debridement     This order was created via procedure documentation   • Wound Procedure Treatment Traumatic Right Pretibial     This order was created via procedure documentation         Patito Kaiser MD

## 2024-07-23 NOTE — PROGRESS NOTES
Wound Procedure Treatment Traumatic Right Pretibial    Performed by: Dariel Harrington RN  Authorized by: Patito Kaiser MD    Associated wounds:   Wound 06/04/24 Traumatic Pretibial Right  Applied Topical: Mupirocin ointment    Applied primary dressing:  Collagen dressing  Dressing secured with:  Size F, Elastic tubular stocking, Gunner and Tape

## 2024-07-23 NOTE — PATIENT INSTRUCTIONS
Orders Placed This Encounter   Procedures    Wound cleansing and dressings Traumatic Right Pretibial     Wash your hands with soap and water.  Remove old dressing, discard into plastic bag and place in trash.    Cleanse the wound with a mild, unscented soap (Dove) prior to applying a clean dressing. Do not use tissue or cotton balls.   Do not scrub the wound. Pat dry using gauze.     Avoid showering for the week  Do not leave wound open to air, apply new dressing immediately.     Cleanse the wound with prophase  Apply Endoform ( moisten with saline) to the deepest area of the wound and Mupirocin to the rest R leg wound.   If you notice that the Endoform is not dissolving then switch to the Mupirocin.  Cover with gauze or ABD pad  Secure with rolled gauze and tape and a double layer of Spandigrip size F if you can tolerate it.   Change dressing every 2 days.     Continue AmLactin moisturizer and apply to bilateral lower extremity daily   Watch for signs of infection (redness,warmth to the touch, increased pain, odor, pus, swelling, fever, chills, nausea, vomiting). If you notice any of these call the wound center or proceed to the ER.     Elastic Tubular Stocking-Spandagrip Size F     Tubular elastic bandage: Apply from base of toes to behind the knee. Apply in AM, may remove for sleep.  Avoid prolonged standing in one place.  Elevate leg(s) above the level of the heart when sitting or as much as possible     Standing Status:   Future     Standing Expiration Date:   7/30/2024    Debridement     This order was created via procedure documentation

## 2024-07-30 ENCOUNTER — OFFICE VISIT (OUTPATIENT)
Facility: CLINIC | Age: 79
End: 2024-07-30
Payer: MEDICARE

## 2024-07-30 VITALS
TEMPERATURE: 98 F | SYSTOLIC BLOOD PRESSURE: 129 MMHG | DIASTOLIC BLOOD PRESSURE: 67 MMHG | RESPIRATION RATE: 18 BRPM | HEART RATE: 63 BPM

## 2024-07-30 DIAGNOSIS — S81.801A OPEN WOUND OF RIGHT LOWER EXTREMITY, INITIAL ENCOUNTER: Primary | ICD-10-CM

## 2024-07-30 PROCEDURE — 97597 DBRDMT OPN WND 1ST 20 CM/<: CPT | Performed by: FAMILY MEDICINE

## 2024-07-30 RX ORDER — KETOCONAZOLE 20 MG/G
1 CREAM TOPICAL AS NEEDED
COMMUNITY

## 2024-07-30 RX ORDER — LIDOCAINE 40 MG/G
CREAM TOPICAL ONCE
Status: COMPLETED | OUTPATIENT
Start: 2024-07-30 | End: 2024-07-30

## 2024-07-30 RX ORDER — TRAMADOL HYDROCHLORIDE 50 MG/1
50 TABLET ORAL EVERY 6 HOURS PRN
COMMUNITY
Start: 2024-07-23

## 2024-07-30 RX ORDER — ACETAMINOPHEN 500 MG
1 TABLET ORAL EVERY 6 HOURS PRN
COMMUNITY

## 2024-07-30 RX ADMIN — LIDOCAINE: 40 CREAM TOPICAL at 11:23

## 2024-07-30 NOTE — PATIENT INSTRUCTIONS
Orders Placed This Encounter   Procedures    Wound cleansing and dressings Traumatic Right Pretibial     Wound location Right leg  Change dressing every 2 days  You may remove the dressing and shower. Do not leave wound open to air, apply new dressing immediately.  Cleanse the wound with mild unscented soap (such as Dove), pat dry.   Apply Mupirocin  to the wound.  Cover with gauze or ABD pad  Secure with rolled gauze and tape and a double layer of Spandigrip size F if you can tolerate it.   Change dressing every 2 days.     Continue AmLactin moisturizer and apply to bilateral lower extremity daily   Watch for signs of infection (redness,warmth to the touch, increased pain, odor, pus, swelling, fever, chills, nausea, vomiting). If you notice any of these call the wound center or proceed to the ER.     Elastic Tubular Stocking-Spandagrip Size F     Tubular elastic bandage: Apply from base of toes to behind the knee. Apply in AM, may remove for sleep.  Avoid prolonged standing in one place.  Elevate leg(s) above the level of the heart when sitting or as much as possible     Standing Status:   Future     Standing Expiration Date:   8/6/2024

## 2024-07-30 NOTE — PROGRESS NOTES
"Patient ID: Hina Gutierrez is a 79 y.o. female Date of Birth 1945       Chief Complaint   Patient presents with   • Follow Up Wound Care Visit       Allergies:  Doxycycline and Penicillins    Diagnosis:      Diagnosis ICD-10-CM Associated Orders   1. Open wound of right lower extremity, initial encounter  S81.801A lidocaine (LMX) 4 % cream     Wound cleansing and dressings Traumatic Right Pretibial     Debridement     Wound Procedure Treatment Traumatic Right Pretibial              Assessment & Plan:  Traumatic open wound RLE: Measurements are again improved.  Proximal area remains healed.  There was a thin hyperkeratotic layer overlying the former wound that was mechanically debrided with gauze.  Skin beneath this area remains healed/epithelialized.  Distal small remaining open area also with improvement.  Eschar and hyperkeratotic debris present requiring selective debridement revealing very small remaining open wound.  The deepest portion continues to fill in, I do suspect that this may heal with a slight divot but is no longer with significant depth.  No signs of infection.  Selective debridement  Continue mupirocin ointment  Continue Tubigrip  Continue to recommend regular lower extremity elevation when resting and avoid prolonged long periods of time with legs in dependent position  ER precautions reviewed, she expresses understanding  Follow-up in 1 week or sooner if needed    Portions of the record may have been created with voice recognition software. Occasional wrong word or \"sound alike\" substitutions may have occurred due to the inherent limitations of voice recognition software. Read the chart carefully and recognize, using context, where substitutions have occurred.    Subjective:   6/4/2024: Initial visit to wound center with Dr. Arellano.  Please see visit documentation    6/11/2024: Hina presented today along with her  for follow-up evaluation of traumatic wound right lower " extremity.  At her last wound visit, PolyMem Ag recommended to be used.  They reported that on some occasions the dressing did stick but easily removed after soaking with saline.  She has not had fever and/or chills.  No other new acute complaints.    06/18/2024: Hina presents today for follow-up of traumatic wound RLE.  She reports she feels her wound looks better and she has no new acute complaints today.  She denies fever, chills.    6/25/2024: Hina presents today for follow-up of traumatic wound right lower extremity.  She reports she has no new acute complaints today.  She denies fever, chills.  Does try to elevate her legs when she is resting.    7/2/2024: Hina presents today for f/u of RLE wound.  She reports she feels her wound is doing well.  States that she ran out of the PolyMem Ag so utilized the mupirocin ointment for her most recent dressing change.  She has not had fever and/or chills.  Continues to attempt to elevate her lower extremities most times when she is resting.    7/9/2024: Hina presents today for follow-up of right lower extremity wound.  She has no new acute complaints today.  She denies fever, chills.  Has been elevating her legs when resting.    7/16/2024: Hina presents today for follow-up of her right leg wound.  She reports she is doing well and is pleased with the progress.  She is utilize a double layer Tubigrip's without difficulty.  She denies fever, chills.  Continues to elevate her legs.    7/23/2024: Hina presents today for f/u of right leg wound.  She reports that she was last here she had been using mupirocin ointment only.  Appears there was a bit of confusion regarding wound care instructions which we reviewed today.  She denies fever, chills.  Reports she has been using the Tubigrip and elevating her legs.  States she is very pleased with the progress of her wound.    7/30/2024: Hina presents today for follow-up of right leg wound.  She states that since she was last  seen in wound center, she has no new acute complaints today.  She denies fever, chills.  Has been elevating her legs.        The following portions of the patient's history were reviewed and updated as appropriate:   Patient Active Problem List   Diagnosis   • Chest pain in adult   • Acquired hypothyroidism   • Hyperlipidemia   • Hyperglycemia   • Leukocytosis     Past Medical History:   Diagnosis Date   • Anxiety    • Disease of thyroid gland    • Hypercholesterolemia    • Rosacea      Past Surgical History:   Procedure Laterality Date   • BACK SURGERY     • HYSTERECTOMY  1999     Family History   Problem Relation Age of Onset   • Heart disease Mother    • Heart disease Father    • No Known Problems Maternal Grandmother    • No Known Problems Maternal Grandfather    • Breast cancer Paternal Grandmother    • No Known Problems Paternal Grandfather    • No Known Problems Brother    • No Known Problems Maternal Aunt    • No Known Problems Maternal Aunt       Social History     Socioeconomic History   • Marital status: /Civil Union     Spouse name: None   • Number of children: None   • Years of education: None   • Highest education level: None   Occupational History   • None   Tobacco Use   • Smoking status: Never   • Smokeless tobacco: Never   Vaping Use   • Vaping status: Never Used   Substance and Sexual Activity   • Alcohol use: Yes   • Drug use: Not Currently   • Sexual activity: None   Other Topics Concern   • None   Social History Narrative   • None     Social Determinants of Health     Financial Resource Strain: Low Risk  (3/20/2024)    Received from [facility]    Financial Resource Strain    • Do you have any trouble paying for your medications, or do you think you might in the future?: No    • Does your family have trouble paying for medicine? (Household - for ages 0-17 years): Not on file   Food Insecurity: No Food Insecurity (3/20/2024)    Received from [facility]    Food Insecurity    • Do you need  food for this week?: No    • Are you able to get enough food for your family? (Household - for ages 0-17 years): Not on file    • Does your family need food this week? (Household - for ages 0-17 years): Not on file    • Do you always have enough food for your family? (Household - for ages 0-17 years): Not on file   Transportation Needs: No Transportation Needs (3/20/2024)    Received from [email or web address]    Transportation Needs    • READ ONLY Do you have trouble getting a ride to medical visits or work?: Never True    • Does your family have a hard time getting a ride to doctors’ visits? (Household - for ages 0-17 years): Not on file    • Has lack of transportation kept you from medical appointments, meetings, work, or from getting things needed for daily living? Check all that apply. (Adult - for ages 18 years and over): Not on file    • Do you (or your family) have trouble finding or paying for a ride (transportation)? (Household - for ages 0-17 years): Not on file   Physical Activity: Not on file   Stress: Not on file   Social Connections: Socially Integrated (3/20/2024)    Received from [email or web address]    Social Connections    • How often do you feel lonely or isolated from those around you?: Never   Intimate Partner Violence: Unknown (6/4/2021)    [facility]    Intimate Partner Violence    • Within the last year, have you been afraid of your partner, ex-partner or family member?: Not on file    • Within the last year, have you been humiliated or emotionally abused in other ways by your partner, ex-partner or family member?: Not on file    • Within the last year, have you been kicked, hit, slapped, or otherwise physically hurt by your partner, ex-partner or family member?: Not on file    • Within the last year, have you been raped or forced to have any kind of sexual activity by your partner, ex-partner or family member?: Not on file   Housing Stability: Low Risk   (3/20/2024)    Received from [facility]    • Do you currently live in a shelter or have no steady place to sleep at night?: No    • READ ONLY Do you think you are at risk of becoming homeless?: No    • Does your family worry about paying for your home or becoming homeless? (Household - for ages 0-17 years): Not on file    • Are you homeless or worried that you might be in the future? (Adult - for ages 18 years and over): Not on file    • Are you (or your family) homeless or worried that you might be in the future? (Household - for ages 0-17 years): Not on file        Current Outpatient Medications:   •  ciclopirox (LOPROX) 0.77 % cream, Apply 1 Application topically if needed (rash), Disp: , Rfl:   •  ketoconazole (NIZORAL) 2 % cream, Apply 1 Application topically if needed for itching, Disp: , Rfl:   •  traMADol (ULTRAM) 50 mg tablet, Take 50 mg by mouth every 6 (six) hours as needed for moderate pain or severe pain, Disp: , Rfl:   •  acetaminophen (TYLENOL) 500 mg tablet, Take 1 tablet by mouth every 6 (six) hours as needed, Disp: , Rfl:   •  Azelaic Acid 15 % cream, Apply 1 Application topically in the morning, Disp: , Rfl:   •  Cholecalciferol (VITAMIN D3) 50 MCG (2000 UT) TABS, Take by mouth every other day , Disp: , Rfl:   •  FLUoxetine (PROzac) 10 mg capsule, Take 10 mg by mouth daily, Disp: , Rfl:   •  levothyroxine (Synthroid) 75 mcg tablet, Take by mouth daily in the early morning , Disp: , Rfl:   •  Multiple Vitamin (MULTI-VITAMIN) tablet, Take by mouth, Disp: , Rfl:   •  mupirocin (BACTROBAN) 2 % ointment, Please apply topically to R leg wound with dressing changes, Disp: 22 g, Rfl: 0  •  Naproxen Sodium 220 MG CAPS, Take 220 mg by mouth 2 (two) times a day, Disp: , Rfl:   •  simvastatin (ZOCOR) 40 mg tablet, Take by mouth daily at bedtime , Disp: , Rfl:   •  vitamin B-12 (VITAMIN B-12) 1,000 mcg tablet, Take by mouth daily, Disp: , Rfl:   No current facility-administered medications  for this visit.    Review of Systems   Constitutional:  Negative for chills and fever.   Cardiovascular:  Negative for leg swelling.   Musculoskeletal:  Positive for gait problem (ambulates w/ cane).   Skin:  Positive for wound (RLE).   Psychiatric/Behavioral:  The patient is not nervous/anxious.        Objective:  /67   Pulse 63   Temp 98 °F (36.7 °C)   Resp 18         Physical Exam  Vitals and nursing note reviewed.   Constitutional:       General: She is not in acute distress.     Appearance: She is not ill-appearing, toxic-appearing or diaphoretic.   HENT:      Head: Normocephalic and atraumatic.   Eyes:      General: No scleral icterus.  Cardiovascular:      Rate and Rhythm: Normal rate.      Pulses:           Dorsalis pedis pulses are 2+ on the right side.        Posterior tibial pulses are 2+ on the right side.      Comments: Stable - b/l LE edema, predominantly non pitting, around 1+  Pulmonary:      Effort: Pulmonary effort is normal. No respiratory distress.   Musculoskeletal:      Right lower le+ Edema present.      Left lower le+ Edema present.   Skin:     General: Skin is warm.      Findings: Wound present.             Comments: 1-   Measurements are again improved.  Proximal area remains healed.  There was a thin hyperkeratotic layer overlying the former wound that was mechanically debrided with gauze.  Skin beneath this area remains healed/epithelialized.  Distal small remaining open area also with improvement.  Eschar and hyperkeratotic debris present requiring selective debridement revealing very small remaining open wound.  The deepest portion continues to fill in, I do suspect that this may heal with a slight divot but is no longer with significant depth.  No purulent drainage, acute erythema, lymphangitic streaking, induration, fluctuance.    C/w evidence of venous stasis on bilateral lower extremities with scattered varicosities, areas of hyperpigmentation/hemosiderin  "deposits    See full wound description    Neurological:      Mental Status: She is alert and oriented to person, place, and time.      Gait: Gait abnormal (ambulates w/ cane).   Psychiatric:         Mood and Affect: Mood normal.         Behavior: Behavior normal.         Wound 06/04/24 Traumatic Pretibial Right (Active)   Wound Image     07/30/24 1132   Wound Description Epithelialization;Pink;Yellow;Dry;Slough 07/30/24 1121   Ashlyn-wound Assessment Scar Tissue 07/30/24 1121   Wound Length (cm) 0.1 cm 07/30/24 1121   Wound Width (cm) 0.1 cm 07/30/24 1121   Wound Depth (cm) 0.2 cm 07/30/24 1121   Wound Surface Area (cm^2) 0.01 cm^2 07/30/24 1121   Wound Volume (cm^3) 0.002 cm^3 07/30/24 1121   Calculated Wound Volume (cm^3) 0 cm^3 07/30/24 1121   Change in Wound Size % 100 07/30/24 1121   Drainage Amount None 07/30/24 1121   Drainage Description Serosanguineous;Yellow 07/23/24 1052   Non-staged Wound Description Full thickness 07/23/24 1052   Treatments Cleansed 06/18/24 1059         Debridement    Universal Protocol:  Consent: Verbal consent obtained. Written consent obtained.  Consent given by: patient  Time out: Immediately prior to procedure a \"time out\" was called to verify the correct patient, procedure, equipment, support staff and site/side marked as required.  Patient understanding: patient states understanding of the procedure being performed  Patient identity confirmed: verbally with patient    Debridement Details  Performed by: physician  Debridement type: selective  Pain control: lidocaine 4%      Post-debridement measurements  Length (cm): 0.1  Width (cm): 0.1  Depth (cm): 0.2  Percent debrided: 100%  Surface Area (cm^2): 0.01  Area Debrided (cm^2): 0.01  Volume (cm^3): 0    Tissue and other material debrided: dermis  Devitalized tissue debrided: exudate and necrotic debris  Instrument(s) utilized: curette  Bleeding: small  Hemostasis obtained with: pressure  Procedural pain (0-10): 0  Post-procedural " pain: 0   Response to treatment: procedure was tolerated well         Post selective debridement image obtained today showing wound beneath the eschar and hyperkeratosis, measurements improved.                  Lab Results   Component Value Date    HGBA1C 5.7 (H) 03/05/2024       Wound Instructions:  Orders Placed This Encounter   Procedures   • Wound cleansing and dressings Traumatic Right Pretibial     Wound location Right leg  Change dressing every 2 days  You may remove the dressing and shower. Do not leave wound open to air, apply new dressing immediately.  Cleanse the wound with mild unscented soap (such as Dove), pat dry.   Apply Mupirocin  to the wound.  Cover with gauze or ABD pad  Secure with rolled gauze and tape and a double layer of Spandigrip size F if you can tolerate it.   Change dressing every 2 days.     Continue AmLactin moisturizer and apply to bilateral lower extremity daily   Watch for signs of infection (redness,warmth to the touch, increased pain, odor, pus, swelling, fever, chills, nausea, vomiting). If you notice any of these call the wound center or proceed to the ER.     Elastic Tubular Stocking-Spandagrip Size F     Tubular elastic bandage: Apply from base of toes to behind the knee. Apply in AM, may remove for sleep.  Avoid prolonged standing in one place.  Elevate leg(s) above the level of the heart when sitting or as much as possible     Standing Status:   Future     Standing Expiration Date:   8/6/2024   • Debridement     This order was created via procedure documentation   • Wound Procedure Treatment Traumatic Right Pretibial     This order was created via procedure documentation         Patito Kaiser MD

## 2024-07-30 NOTE — PROGRESS NOTES
Wound Procedure Treatment Traumatic Right Pretibial    Performed by: Trisha Cabral RN  Authorized by: Patito Kaiser MD    Associated wounds:   Wound 06/04/24 Traumatic Pretibial Right  Wound cleansed with:  NSS  Applied to periwound:  Moisture lotion  Applied Topical: Mupirocin ointment    Applied secondary dressing:  Gauze  Dressing secured with:  Gunner, Tape, Elastic tubular stocking and Size F

## 2024-08-06 ENCOUNTER — OFFICE VISIT (OUTPATIENT)
Facility: CLINIC | Age: 79
End: 2024-08-06
Payer: MEDICARE

## 2024-08-06 ENCOUNTER — APPOINTMENT (OUTPATIENT)
Dept: LAB | Facility: CLINIC | Age: 79
End: 2024-08-06
Payer: MEDICARE

## 2024-08-06 VITALS
DIASTOLIC BLOOD PRESSURE: 66 MMHG | RESPIRATION RATE: 18 BRPM | TEMPERATURE: 97.6 F | HEART RATE: 68 BPM | SYSTOLIC BLOOD PRESSURE: 124 MMHG

## 2024-08-06 DIAGNOSIS — S81.801A OPEN WOUND OF RIGHT LOWER EXTREMITY, INITIAL ENCOUNTER: Primary | ICD-10-CM

## 2024-08-06 DIAGNOSIS — Z00.6 ENCOUNTER FOR EXAMINATION FOR NORMAL COMPARISON OR CONTROL IN CLINICAL RESEARCH PROGRAM: ICD-10-CM

## 2024-08-06 PROCEDURE — 36415 COLL VENOUS BLD VENIPUNCTURE: CPT

## 2024-08-06 PROCEDURE — 99212 OFFICE O/P EST SF 10 MIN: CPT | Performed by: FAMILY MEDICINE

## 2024-08-06 NOTE — PROGRESS NOTES
"Patient ID: Hina Gutierrez is a 79 y.o. female Date of Birth 1945       Chief Complaint   Patient presents with   • Follow Up Wound Care Visit     Right lower leg wound       Allergies:  Doxycycline and Penicillins    Diagnosis:      Diagnosis ICD-10-CM Associated Orders   1. Open wound of right lower extremity, initial encounter  S81.801A Wound cleansing and dressings Traumatic Right Pretibial              Assessment & Plan:  Wound of RLE: Completely epithelialized or healed.   Discussed the importance of long-term edema control and use of long-term compression. Recommended 15-20 mmHg compression stockings. Measurements taken today. Recommended she wear these bilaterally.   Counseled on importance of frequent elevation of leg and increase exercise/walking.  Moisturize skin daily with skin nourishing cream.   Follow up as needed or call with questions or concerns    Portions of the record may have been created with voice recognition software. Occasional wrong word or \"sound alike\" substitutions may have occurred due to the inherent limitations of voice recognition software. Read the chart carefully and recognize, using context, where substitutions have occurred.    Subjective:   6/4/2024: Initial visit to wound center with Dr. Arellano.  Please see visit documentation    6/11/2024: Hina presented today along with her  for follow-up evaluation of traumatic wound right lower extremity.  At her last wound visit, PolyMem Ag recommended to be used.  They reported that on some occasions the dressing did stick but easily removed after soaking with saline.  She has not had fever and/or chills.  No other new acute complaints.    06/18/2024: Hina presents today for follow-up of traumatic wound RLE.  She reports she feels her wound looks better and she has no new acute complaints today.  She denies fever, chills.    6/25/2024: Hina presents today for follow-up of traumatic wound right lower extremity.  She " reports she has no new acute complaints today.  She denies fever, chills.  Does try to elevate her legs when she is resting.    7/2/2024: Hina presents today for f/u of RLE wound.  She reports she feels her wound is doing well.  States that she ran out of the PolyM[employer] Ag so utilized the mupirocin ointment for her most recent dressing change.  She has not had fever and/or chills.  Continues to attempt to elevate her lower extremities most times when she is resting.    7/9/2024: Hina presents today for follow-up of right lower extremity wound.  She has no new acute complaints today.  She denies fever, chills.  Has been elevating her legs when resting.    7/16/2024: Hina presents today for follow-up of her right leg wound.  She reports she is doing well and is pleased with the progress.  She is utilize a double layer Tubigrip's without difficulty.  She denies fever, chills.  Continues to elevate her legs.    7/23/2024: Hina presents today for f/u of right leg wound.  She reports that she was last here she had been using mupirocin ointment only.  Appears there was a bit of confusion regarding wound care instructions which we reviewed today.  She denies fever, chills.  Reports she has been using the Tubigrip and elevating her legs.  States she is very pleased with the progress of her wound.    7/30/2024: Hina presents today for follow-up of right leg wound.  She states that since she was last seen in wound center, she has no new acute complaints today.  She denies fever, chills.  Has been elevating her legs.    8/6/2024: Hina presents today for follow-up of right leg wound.  She reports that she has had no new issues since she was last seen.  She denies fever, chills.  Continues to elevate her legs and is wearing her Spandagrip.      The following portions of the patient's history were reviewed and updated as appropriate:   Patient Active Problem List   Diagnosis   • Chest pain in adult   • Acquired hypothyroidism    • Hyperlipidemia   • Hyperglycemia   • Leukocytosis     Past Medical History:   Diagnosis Date   • Anxiety    • Disease of thyroid gland    • Hypercholesterolemia    • Rosacea      Past Surgical History:   Procedure Laterality Date   • BACK SURGERY     • HYSTERECTOMY  1999     Family History   Problem Relation Age of Onset   • Heart disease Mother    • Heart disease Father    • No Known Problems Maternal Grandmother    • No Known Problems Maternal Grandfather    • Breast cancer Paternal Grandmother    • No Known Problems Paternal Grandfather    • No Known Problems Brother    • No Known Problems Maternal Aunt    • No Known Problems Maternal Aunt       Social History     Socioeconomic History   • Marital status: /Civil Union     Spouse name: Not on file   • Number of children: Not on file   • Years of education: Not on file   • Highest education level: Not on file   Occupational History   • Not on file   Tobacco Use   • Smoking status: Never   • Smokeless tobacco: Never   Vaping Use   • Vaping status: Never Used   Substance and Sexual Activity   • Alcohol use: Yes   • Drug use: Not Currently   • Sexual activity: Not on file   Other Topics Concern   • Not on file   Social History Narrative   • Not on file     Social Determinants of Health     Financial Resource Strain: Low Risk  (3/20/2024)    Received from [facility]    Financial Resource Strain    • Do you have any trouble paying for your medications, or do you think you might in the future?: No    • Does your family have trouble paying for medicine? (Household - for ages 0-17 years): Not on file   Food Insecurity: No Food Insecurity (3/20/2024)    Received from [facility]    Food Insecurity    • Do you need food for this week?: No    • Are you able to get enough food for your family? (Household - for ages 0-17 years): Not on file    • Does your family need food this week? (Household - for ages 0-17 years): Not on file    • Do you always have enough food for  your family? (Household - for ages 0-17 years): Not on file   Transportation Needs: No Transportation Needs (3/20/2024)    Received from [facility]    Transportation Needs    • READ ONLY Do you have trouble getting a ride to medical visits or work?: Never True    • Does your family have a hard time getting a ride to doctors’ visits? (Household - for ages 0-17 years): Not on file    • Has lack of transportation kept you from medical appointments, meetings, work, or from getting things needed for daily living? Check all that apply. (Adult - for ages 18 years and over): Not on file    • Do you (or your family) have trouble finding or paying for a ride (transportation)? (Household - for ages 0-17 years): Not on file   Physical Activity: Not on file   Stress: Not on file   Social Connections: Socially Integrated (3/20/2024)    Received from [facility]    Social Connections    • How often do you feel lonely or isolated from those around you?: Never   Intimate Partner Violence: Unknown (6/4/2021)    [facility]    Intimate Partner Violence    • Within the last year, have you been afraid of your partner, ex-partner or family member?: Not on file    • Within the last year, have you been humiliated or emotionally abused in other ways by your partner, ex-partner or family member?: Not on file    • Within the last year, have you been kicked, hit, slapped, or otherwise physically hurt by your partner, ex-partner or family member?: Not on file    • Within the last year, have you been raped or forced to have any kind of sexual activity by your partner, ex-partner or family member?: Not on file   Housing Stability: Low Risk  (3/20/2024)    Received from [facility]    Housing Stability    • Do you currently live in a shelter or have no steady place to sleep at night?: No    • READ ONLY Do you think you are at risk of becoming homeless?: No    • Does your family worry about paying for  your home or becoming homeless? (Household - for ages 0-17 years): Not on file    • Are you homeless or worried that you might be in the future? (Adult - for ages 18 years and over): Not on file    • Are you (or your family) homeless or worried that you might be in the future? (Household - for ages 0-17 years): Not on file        Current Outpatient Medications:   •  acetaminophen (TYLENOL) 500 mg tablet, Take 1 tablet by mouth every 6 (six) hours as needed, Disp: , Rfl:   •  Azelaic Acid 15 % cream, Apply 1 Application topically in the morning, Disp: , Rfl:   •  Cholecalciferol (VITAMIN D3) 50 MCG (2000 UT) TABS, Take by mouth every other day , Disp: , Rfl:   •  ciclopirox (LOPROX) 0.77 % cream, Apply 1 Application topically if needed (rash), Disp: , Rfl:   •  FLUoxetine (PROzac) 10 mg capsule, Take 10 mg by mouth daily, Disp: , Rfl:   •  ketoconazole (NIZORAL) 2 % cream, Apply 1 Application topically if needed for itching, Disp: , Rfl:   •  levothyroxine (Synthroid) 75 mcg tablet, Take by mouth daily in the early morning , Disp: , Rfl:   •  Multiple Vitamin (MULTI-VITAMIN) tablet, Take by mouth, Disp: , Rfl:   •  mupirocin (BACTROBAN) 2 % ointment, Please apply topically to R leg wound with dressing changes, Disp: 22 g, Rfl: 0  •  Naproxen Sodium 220 MG CAPS, Take 220 mg by mouth 2 (two) times a day, Disp: , Rfl:   •  simvastatin (ZOCOR) 40 mg tablet, Take by mouth daily at bedtime , Disp: , Rfl:   •  traMADol (ULTRAM) 50 mg tablet, Take 50 mg by mouth every 6 (six) hours as needed for moderate pain or severe pain, Disp: , Rfl:   •  vitamin B-12 (VITAMIN B-12) 1,000 mcg tablet, Take by mouth daily, Disp: , Rfl:     Review of Systems   Constitutional:  Negative for chills and fever.   Cardiovascular:  Negative for leg swelling.   Musculoskeletal:  Positive for gait problem (ambulates w/ cane).   Skin:  Positive for wound (RLE).   Psychiatric/Behavioral:  The patient is not nervous/anxious.        Objective:  BP  124/66   Pulse 68   Temp 97.6 °F (36.4 °C)   Resp 18   Pain Score: 0-No pain     Physical Exam  Vitals and nursing note reviewed.   Constitutional:       General: She is not in acute distress.     Appearance: She is not ill-appearing, toxic-appearing or diaphoretic.   HENT:      Head: Normocephalic and atraumatic.   Eyes:      General: No scleral icterus.  Cardiovascular:      Rate and Rhythm: Normal rate.      Pulses:           Dorsalis pedis pulses are 2+ on the right side.        Posterior tibial pulses are 2+ on the right side.      Comments: Stable - b/l LE edema, predominantly non pitting, around 1+  Pulmonary:      Effort: Pulmonary effort is normal. No respiratory distress.   Musculoskeletal:      Right lower le+ Edema present.      Left lower le+ Edema present.   Skin:     General: Skin is warm.             Comments: 1-   HEALED!    C/w evidence of venous stasis on bilateral lower extremities with scattered varicosities, areas of hyperpigmentation/hemosiderin deposits    See full wound description    Neurological:      Mental Status: She is alert and oriented to person, place, and time.      Gait: Gait abnormal (ambulates w/ cane).   Psychiatric:         Mood and Affect: Mood normal.         Behavior: Behavior normal.           Wound 24 Traumatic Pretibial Right (Active)   Wound Image   24 1050   Wound Description Pink;Dry;Intact;Epithelialization 24 1052   Ashlyn-wound Assessment Scar Tissue;Intact;Pink 24 1052   Wound Length (cm) 0 cm 24 1052   Wound Width (cm) 0 cm 24 1052   Wound Depth (cm) 0 cm 24 1052   Wound Surface Area (cm^2) 0 cm^2 24 1052   Wound Volume (cm^3) 0 cm^3 24 1052   Calculated Wound Volume (cm^3) 0 cm^3 24 1052   Change in Wound Size % 100 24 1052   Drainage Amount None 24 1052   Drainage Description Serosanguineous;Yellow 24 1052   Non-staged Wound Description Full thickness 24 1052    Treatments Cleansed 06/18/24 1059               Lab Results   Component Value Date    HGBA1C 5.7 (H) 03/05/2024       Wound Instructions:  Orders Placed This Encounter   Procedures   • Wound cleansing and dressings Traumatic Right Pretibial     Wound cleansing and dressings Traumatic Right Pretibial   Wound location Right leg healed today!    Continue AmLactin moisturizer and apply to bilateral lower extremity daily   Watch for signs of infection (redness,warmth to the touch, increased pain, odor, pus, swelling, fever, chills, nausea, vomiting). If you notice any of these call the wound center or proceed to the ER.     Elastic Tubular Stocking-Spandagrip Size F  Tubular elastic bandage: Apply from base of toes to behind the knee. Apply in AM, may remove for sleep.  Avoid prolonged standing in one place.  Elevate leg(s) above the level of the heart when sitting or as much as possible    Order compression garment!     Standing Status:   Future     Standing Expiration Date:   8/13/2024       Patito Kaiser MD

## 2024-08-06 NOTE — PATIENT INSTRUCTIONS
Orders Placed This Encounter   Procedures    Wound cleansing and dressings Traumatic Right Pretibial     Wound cleansing and dressings Traumatic Right Pretibial   Wound location Right leg healed today!    Continue AmLactin moisturizer and apply to bilateral lower extremity daily   Watch for signs of infection (redness,warmth to the touch, increased pain, odor, pus, swelling, fever, chills, nausea, vomiting). If you notice any of these call the wound center or proceed to the ER.     Elastic Tubular Stocking-Spandagrip Size F  Tubular elastic bandage: Apply from base of toes to behind the knee. Apply in AM, may remove for sleep.  Avoid prolonged standing in one place.  Elevate leg(s) above the level of the heart when sitting or as much as possible    Order compression garment!     Standing Status:   Future     Standing Expiration Date:   8/13/2024      Right leg measurements for compression garment:   Foot: 20.3 cm  Ankle: 23.1 cm  Calf: 41.5 cm    41.5 cm knee to floor

## 2024-08-16 LAB
APOB+LDLR+PCSK9 GENE MUT ANL BLD/T: NOT DETECTED
BRCA1+BRCA2 DEL+DUP + FULL MUT ANL BLD/T: NOT DETECTED
MLH1+MSH2+MSH6+PMS2 GN DEL+DUP+FUL M: NOT DETECTED

## 2024-09-11 ENCOUNTER — TELEPHONE (OUTPATIENT)
Dept: PODIATRY | Age: 79
End: 2024-09-11

## 2024-09-11 NOTE — TELEPHONE ENCOUNTER
Called and left voice message for patient. Appointment needs to be rescheduled to next opening due to Dr. Jernigan not being in office. Please schedule to next opening.     Left call center number: [phone]

## 2024-09-20 ENCOUNTER — APPOINTMENT (EMERGENCY)
Dept: RADIOLOGY | Facility: HOSPITAL | Age: 79
End: 2024-09-20
Payer: MEDICARE

## 2024-09-20 ENCOUNTER — HOSPITAL ENCOUNTER (EMERGENCY)
Facility: HOSPITAL | Age: 79
Discharge: HOME | End: 2024-09-20
Attending: EMERGENCY MEDICINE
Payer: MEDICARE

## 2024-09-20 VITALS
OXYGEN SATURATION: 98 % | DIASTOLIC BLOOD PRESSURE: 71 MMHG | SYSTOLIC BLOOD PRESSURE: 126 MMHG | TEMPERATURE: 98.2 F | HEART RATE: 67 BPM | RESPIRATION RATE: 17 BRPM

## 2024-09-20 DIAGNOSIS — M19.90 OSTEOARTHRITIS: ICD-10-CM

## 2024-09-20 DIAGNOSIS — M25.551 RIGHT HIP PAIN: Primary | ICD-10-CM

## 2024-09-20 PROCEDURE — 73502 X-RAY EXAM HIP UNI 2-3 VIEWS: CPT

## 2024-09-20 PROCEDURE — 99283 EMERGENCY DEPT VISIT LOW MDM: CPT

## 2024-09-20 PROCEDURE — 99284 EMERGENCY DEPT VISIT MOD MDM: CPT | Performed by: EMERGENCY MEDICINE

## 2024-09-20 RX ORDER — HYDROCODONE BITARTRATE AND ACETAMINOPHEN 5; 325 MG/1; MG/1
1 TABLET ORAL EVERY 6 HOURS PRN
Qty: 10 TABLET | Refills: 0 | Status: SHIPPED | OUTPATIENT
Start: 2024-09-20

## 2024-09-20 RX ORDER — HYDROCODONE BITARTRATE AND ACETAMINOPHEN 5; 325 MG/1; MG/1
1 TABLET ORAL ONCE
Status: COMPLETED | OUTPATIENT
Start: 2024-09-20 | End: 2024-09-20

## 2024-09-20 RX ADMIN — HYDROCODONE BITARTRATE AND ACETAMINOPHEN 1 TABLET: 5; 325 TABLET ORAL at 12:47

## 2024-09-20 NOTE — ED PROVIDER NOTES
1. Right hip pain    2. Osteoarthritis      ED Disposition       ED Disposition   Discharge    Condition   Stable    Date/Time   Fri Sep 20, 2024 12:57 PM    Comment   Hina Gutierrez discharge to home/self care.                   Assessment & Plan       Medical Decision Making   Patient presents with right hip pain.  Given history, exam and work-up, patient likely has osteoarthritis.  I have low suspicion for fracture, dislocation, significant ligamentous injury, septic arthritis, gout flare, new autoimmune arthropathy or gonococcal arthropathy.      Problems Addressed:  Osteoarthritis: acute illness or injury  Right hip pain: acute illness or injury    Amount and/or Complexity of Data Reviewed  Radiology: ordered and independent interpretation performed. Decision-making details documented in ED Course.    Risk  OTC drugs.  Prescription drug management.                     Medications   HYDROcodone-acetaminophen (NORCO) 5-325 mg per tablet 1 tablet (1 tablet Oral Given 9/20/24 2467)       History of Present Illness       Patient is a 79-year-old female presenting to the emergency department complaining of right hip pain, states she has been having some increased pain for the past few weeks, however 2 or 3 days ago she moved a certain way and felt a crunching in her right hip, has had increased pain with weightbearing since then, she denies any fall or trauma, no numbness or tingling, no bowel or bladder dysfunction, has history of a previous lumbar fusion about a year ago, has been taking Tylenol, Motrin and tramadol at home which provides minimal symptomatic relief        Review of Systems   Constitutional: Negative.    HENT: Negative.     Eyes: Negative.    Respiratory: Negative.     Cardiovascular: Negative.    Gastrointestinal: Negative.    Endocrine: Negative.    Genitourinary: Negative.    Musculoskeletal:  Positive for arthralgias.   Skin: Negative.    Allergic/Immunologic: Negative.    Neurological:  Negative.    Hematological: Negative.    Psychiatric/Behavioral: Negative.             Objective     ED Triage Vitals   Temperature Pulse Blood Pressure Respirations SpO2 Patient Position - Orthostatic VS   09/20/24 1136 09/20/24 1136 09/20/24 1136 09/20/24 1136 09/20/24 1136 09/20/24 1136   98.2 °F (36.8 °C) 62 124/78 18 97 % Sitting      Temp Source Heart Rate Source BP Location FiO2 (%) Pain Score    09/20/24 1136 09/20/24 1136 09/20/24 1136 -- 09/20/24 1208    Temporal Monitor Left arm  10 - Worst Possible Pain        Physical Exam  Constitutional:       Appearance: She is well-developed.   HENT:      Head: Normocephalic and atraumatic.   Eyes:      Conjunctiva/sclera: Conjunctivae normal.      Pupils: Pupils are equal, round, and reactive to light.   Cardiovascular:      Rate and Rhythm: Normal rate.   Pulmonary:      Effort: Pulmonary effort is normal.   Abdominal:      Palpations: Abdomen is soft.   Musculoskeletal:         General: Normal range of motion.      Cervical back: Normal range of motion and neck supple.   Skin:     General: Skin is warm and dry.   Neurological:      Mental Status: She is alert and oriented to person, place, and time.         Labs Reviewed - No data to display  XR hip/pelv 2-3 vws right if performed   ED Interpretation by Naty Al DO (09/20 1258)   No acute findings          Procedures    ED Medication and Procedure Management   Prior to Admission Medications   Prescriptions Last Dose Informant Patient Reported? Taking?   Azelaic Acid 15 % cream   Yes No   Sig: Apply 1 Application topically in the morning   Cholecalciferol (VITAMIN D3) 50 MCG (2000 UT) TABS   Yes No   Sig: Take by mouth every other day    FLUoxetine (PROzac) 10 mg capsule   Yes No   Sig: Take 10 mg by mouth daily   Multiple Vitamin (MULTI-VITAMIN) tablet   Yes No   Sig: Take by mouth   Naproxen Sodium 220 MG CAPS   Yes No   Sig: Take 220 mg by mouth 2 (two) times a day   acetaminophen (TYLENOL) 500 mg tablet    Yes No   Sig: Take 1 tablet by mouth every 6 (six) hours as needed   ciclopirox (LOPROX) 0.77 % cream   Yes No   Sig: Apply 1 Application topically if needed (rash)   ketoconazole (NIZORAL) 2 % cream   Yes No   Sig: Apply 1 Application topically if needed for itching   levothyroxine (Synthroid) 75 mcg tablet   Yes No   Sig: Take by mouth daily in the early morning    mupirocin (BACTROBAN) 2 % ointment   No No   Sig: Please apply topically to R leg wound with dressing changes   simvastatin (ZOCOR) 40 mg tablet   Yes No   Sig: Take by mouth daily at bedtime    traMADol (ULTRAM) 50 mg tablet   Yes No   Sig: Take 50 mg by mouth every 6 (six) hours as needed for moderate pain or severe pain   vitamin B-12 (VITAMIN B-12) 1,000 mcg tablet   Yes No   Sig: Take by mouth daily      Facility-Administered Medications: None     Patient's Medications   Discharge Prescriptions    HYDROCODONE-ACETAMINOPHEN (NORCO) 5-325 MG PER TABLET    Take 1 tablet by mouth every 6 (six) hours as needed for pain for up to 10 doses Max Daily Amount: 4 tablets       Start Date: 9/20/2024 End Date: --       Order Dose: 1 tablet       Quantity: 10 tablet    Refills: 0     No discharge procedures on file.     Naty Al DO  09/20/24 [id number]

## 2024-09-26 ENCOUNTER — APPOINTMENT (OUTPATIENT)
Dept: LAB | Facility: CLINIC | Age: 79
End: 2024-09-26
Payer: MEDICARE

## 2024-09-26 DIAGNOSIS — Z01.812 PRE-OPERATIVE LABORATORY EXAMINATION: ICD-10-CM

## 2024-09-26 LAB
BUN SERPL-MCNC: 23 MG/DL (ref 5–25)
CREAT SERPL-MCNC: 0.83 MG/DL (ref 0.6–1.3)
GFR SERPL CREATININE-BSD FRML MDRD: 68 ML/MIN/1.73SQ M

## 2024-09-26 PROCEDURE — 82565 ASSAY OF CREATININE: CPT

## 2024-09-26 PROCEDURE — 36415 COLL VENOUS BLD VENIPUNCTURE: CPT

## 2024-09-26 PROCEDURE — 84520 ASSAY OF UREA NITROGEN: CPT

## 2024-10-18 ENCOUNTER — TELEPHONE (OUTPATIENT)
Age: 79
End: 2024-10-18

## 2024-10-18 ENCOUNTER — OFFICE VISIT (OUTPATIENT)
Dept: PODIATRY | Age: 79
End: 2024-10-18
Payer: MEDICARE

## 2024-10-18 VITALS
HEIGHT: 61 IN | TEMPERATURE: 97.8 F | BODY MASS INDEX: 39.12 KG/M2 | OXYGEN SATURATION: 96 % | SYSTOLIC BLOOD PRESSURE: 128 MMHG | DIASTOLIC BLOOD PRESSURE: 76 MMHG | WEIGHT: 207.2 LBS | RESPIRATION RATE: 17 BRPM | HEART RATE: 65 BPM

## 2024-10-18 DIAGNOSIS — B35.1 ONYCHOMYCOSIS: ICD-10-CM

## 2024-10-18 DIAGNOSIS — I73.9 PVD (PERIPHERAL VASCULAR DISEASE) (HCC): Primary | ICD-10-CM

## 2024-10-18 PROCEDURE — 11721 DEBRIDE NAIL 6 OR MORE: CPT | Performed by: STUDENT IN AN ORGANIZED HEALTH CARE EDUCATION/TRAINING PROGRAM

## 2024-10-18 PROCEDURE — 99203 OFFICE O/P NEW LOW 30 MIN: CPT | Performed by: STUDENT IN AN ORGANIZED HEALTH CARE EDUCATION/TRAINING PROGRAM

## 2024-10-18 RX ORDER — CICLOPIROX 80 MG/ML
SOLUTION TOPICAL
Qty: 6.6 ML | Refills: 2 | Status: SHIPPED | OUTPATIENT
Start: 2024-10-18

## 2024-10-18 NOTE — TELEPHONE ENCOUNTER
PA for PENLACSUBMITTED     via      [x][employer]-Case ID # [id number]       Office notes sent, clinical questions answered. Awaiting determination    Turnaround time for your insurance to make a decision on your Prior Authorization can take 7-21 business days.

## 2024-10-18 NOTE — PROGRESS NOTES
Ambulatory Visit  Name: Hina Gutierrez      : 1945      MRN: [id number]  Encounter Provider: Meghan Jernigan DPM  Encounter Date: 10/18/2024   Encounter department: [facility] [place]    Assessment & Plan  PVD (peripheral vascular disease) ([facility])         Onychomycosis    Orders:    ciclopirox (PENLAC) 8 % solution; Apply topically daily at bedtime File nails in thickness weekly      PLAN:  I reviewed clinical exam with patient in detail today. I have discussed with the patient the pathophysiology of this diagnosis and reviewed how the examination correlates with this diagnosis.  PCP note from 24 reviewed    Foot exam as below. Patient has significant lower extremity risk due to h/o paresthesia, edema, and trophic skin changes to the lower extremity.  Debridement of toenails. Using nail nipper, zen, and curette, nails were sharply debrided, reduced in thickness and length. Devitalized nail tissue and fungal debris excised and removed. Patient tolerated well.    Penlac rx'd for nail fungus  Discussed proper shoe gear, daily inspections of feet, and general foot health with patient.   Patient has Q9  findings and is recommended for at risk foot care every 9-10 weeks.      History of Present Illness     Hina Gutierrez is a 79 y.o. female who presents to clinic for nail care. Nails are long and thick and she cannot reach to cut them due to back pain.       Review of Systems   Constitutional:  Negative for activity change, chills and fever.   HENT: Negative.     Respiratory:  Negative for cough, chest tightness and shortness of breath.    Cardiovascular:  Negative for chest pain and leg swelling.   Endocrine: Negative.    Genitourinary: Negative.    Musculoskeletal:  Positive for back pain.   Neurological: Negative.  Negative for numbness.   Psychiatric/Behavioral: Negative.  Negative for agitation and behavioral problems.            Objective     /76 (BP Location: Left arm,  "Patient Position: Sitting, Cuff Size: Large)   Pulse 65   Temp 97.8 °F (36.6 °C) (Temporal)   Resp 17   Ht 5' 1\" (1.549 m)   Wt 94 kg (207 lb 3.2 oz)   SpO2 96%   BMI 39.15 kg/m²     Physical Exam  Constitutional:       General: She is not in acute distress.     Appearance: Normal appearance. She is obese. She is not ill-appearing.   Cardiovascular:      Comments: B/L DP pulses 1/4. B/L PT pulses 1/4. Legs to toes warm to cool. Diminished pedal hair. B/L LE mild edema with varicosities.   Pulmonary:      Effort: Pulmonary effort is normal.   Musculoskeletal:         General: No tenderness. Normal range of motion.   Skin:     General: Skin is warm.      Capillary Refill: Capillary refill takes less than 2 seconds.      Comments: Atrophic skin to LE - thin, dry and shiny.   No open wounds.  Elongated, thickened, yellowed toenails x10 with subungual debris.   Neurological:      General: No focal deficit present.      Mental Status: She is alert and oriented to person, place, and time.   Psychiatric:         Mood and Affect: Mood normal.         "

## 2024-10-18 NOTE — TELEPHONE ENCOUNTER
PA for PENLAC APPROVED     Date(s) approved October 18, 2024 to September 18, 2025         Patient advised by          [x][employer] Message  []Phone call   [x]LMOM  []L/M to call office as no active Communication consent on file  []Unable to leave detailed message as VM not approved on Communication consent       Pharmacy advised by    [x]Fax  []Phone call    Approval letter scanned into [employer] Yes

## 2024-10-23 NOTE — PROGRESS NOTES
Wound Procedure Treatment Traumatic Right Pretibial    Performed by: Trisha Cabral RN  Authorized by: Zara Chapman DO    Associated wounds:   Wound 06/04/24 Traumatic Pretibial Right  Wound cleansed with:  NSS  Applied primary dressing:  Polymem foam and Silver  Applied secondary dressing:  ABD  Dressing secured with:  Gunner, Tape, Elastic tubular stocking and Size F       First Trimester Sonogram

## 2024-11-14 ENCOUNTER — TELEPHONE (OUTPATIENT)
Dept: PODIATRY | Age: 79
End: 2024-11-14

## 2024-11-14 NOTE — TELEPHONE ENCOUNTER
Called and left message for patient. Appointment on 12/31 needs to be rescheduled doctor will not be in the office that day. Please reschedule to next opening.

## 2024-11-20 ENCOUNTER — TELEPHONE (OUTPATIENT)
Age: 79
End: 2024-11-20

## 2025-01-06 RX ORDER — TIZANIDINE 2 MG/1
2 TABLET ORAL 3 TIMES DAILY
COMMUNITY
Start: 2024-11-14

## 2025-01-08 ENCOUNTER — OFFICE VISIT (OUTPATIENT)
Dept: PODIATRY | Age: 80
End: 2025-01-08
Payer: MEDICARE

## 2025-01-08 VITALS — HEIGHT: 61 IN | WEIGHT: 212.4 LBS | BODY MASS INDEX: 40.1 KG/M2

## 2025-01-08 DIAGNOSIS — B35.1 ONYCHOMYCOSIS: ICD-10-CM

## 2025-01-08 DIAGNOSIS — I73.9 PVD (PERIPHERAL VASCULAR DISEASE) (HCC): Primary | ICD-10-CM

## 2025-01-08 PROCEDURE — 11721 DEBRIDE NAIL 6 OR MORE: CPT | Performed by: STUDENT IN AN ORGANIZED HEALTH CARE EDUCATION/TRAINING PROGRAM

## 2025-01-08 PROCEDURE — RECHECK: Performed by: STUDENT IN AN ORGANIZED HEALTH CARE EDUCATION/TRAINING PROGRAM

## 2025-01-08 RX ORDER — METHOCARBAMOL 750 MG/1
750 TABLET, FILM COATED ORAL EVERY 8 HOURS PRN
COMMUNITY
Start: 2024-12-29

## 2025-01-08 RX ORDER — MELOXICAM 15 MG/1
15 TABLET ORAL
COMMUNITY
Start: 2024-12-23

## 2025-01-08 NOTE — PROGRESS NOTES
Hina Gutierrez  1945  AT RISK FOOT CARE    1. PVD (peripheral vascular disease) (formerly [facility])        2. Onychomycosis            Patient presents for at-risk foot care.  Patient has no acute concerns today.  Patient has significant lower extremity risk due to h/o parasthesia, edema, and trophic skin changes to the lower extremity.    On exam patient has thickened, hypertrophic, discolored, brittle toenails with subungual debris and tenderness x10  Patient has lower extremity edema  PAtients skin is atrophic, thickened nails, and decreased pedal hair  Patient has decreased pinprick and vibratory sensation to his feet and parasthesia    Today's treatment includes:  Debridement of toenails. Using nail nipper, zen, and curette, nails were sharply debrided, reduced in thickness and length. Devitalized nail tissue and fungal debris excised and removed. Patient tolerated well.        Discussed proper shoe gear, daily inspections of feet, and general foot health with patient. Patient has Q9  findings and is recommended for at risk foot care every 9-10 weeks.    Patients most recent complete clinical foot exam was on: 10/18/24

## 2025-05-08 RX ORDER — TRAMADOL HYDROCHLORIDE 50 MG/1
50 TABLET ORAL 2 TIMES DAILY
COMMUNITY
Start: 2025-04-14

## 2025-05-09 ENCOUNTER — PROCEDURE VISIT (OUTPATIENT)
Dept: PODIATRY | Age: 80
End: 2025-05-09
Payer: MEDICARE

## 2025-05-09 VITALS — WEIGHT: 202 LBS | BODY MASS INDEX: 38.14 KG/M2 | HEIGHT: 61 IN

## 2025-05-09 DIAGNOSIS — B35.1 ONYCHOMYCOSIS: Primary | ICD-10-CM

## 2025-05-09 DIAGNOSIS — I73.9 PVD (PERIPHERAL VASCULAR DISEASE) (HCC): ICD-10-CM

## 2025-05-09 PROCEDURE — 11721 DEBRIDE NAIL 6 OR MORE: CPT | Performed by: STUDENT IN AN ORGANIZED HEALTH CARE EDUCATION/TRAINING PROGRAM

## 2025-05-09 RX ORDER — CICLOPIROX 80 MG/ML
SOLUTION TOPICAL
Qty: 6.6 ML | Refills: 2 | Status: SHIPPED | OUTPATIENT
Start: 2025-05-09

## 2025-05-09 NOTE — PROGRESS NOTES
Hina Yuritania  1945  AT RISK FOOT CARE    1. Onychomycosis  ciclopirox (PENLAC) 8 % solution      2. PVD (peripheral vascular disease) ([facility])              Patient presents for at-risk foot care.  Patient has no acute concerns today.  Patient has significant lower extremity risk due to h/o parasthesia, edema, and trophic skin changes to the lower extremity.    On exam patient has thickened, hypertrophic, discolored, brittle toenails with subungual debris and tenderness x10  Patient has lower extremity edema  PAtients skin is atrophic, thickened nails, and decreased pedal hair  Patient has decreased pinprick and vibratory sensation to his feet and parasthesia    Today's treatment includes:  Debridement of toenails. Using nail nipper, zen, and curette, nails were sharply debrided, reduced in thickness and length. Devitalized nail tissue and fungal debris excised and removed. Patient tolerated well.      Refill given for ciclopirox.     Discussed proper shoe gear, daily inspections of feet, and general foot health with patient. Patient has Q9  findings and is recommended for at risk foot care every 9-10 weeks.    Patients most recent complete clinical foot exam was on: 10/18/24

## 2025-05-27 ENCOUNTER — HOSPITAL ENCOUNTER (OUTPATIENT)
Dept: RADIOLOGY | Facility: CLINIC | Age: 80
Discharge: HOME | End: 2025-05-27
Payer: MEDICARE

## 2025-05-27 VITALS — WEIGHT: 210 LBS | BODY MASS INDEX: 42.33 KG/M2 | HEIGHT: 59 IN

## 2025-05-27 DIAGNOSIS — Z12.31 ENCOUNTER FOR SCREENING MAMMOGRAM FOR BREAST CANCER: ICD-10-CM

## 2025-05-27 PROCEDURE — 77067 SCR MAMMO BI INCL CAD: CPT

## 2025-05-27 PROCEDURE — 77063 BREAST TOMOSYNTHESIS BI: CPT

## 2025-06-10 DIAGNOSIS — Z12.31 ENCOUNTER FOR SCREENING MAMMOGRAM FOR MALIGNANT NEOPLASM OF BREAST: ICD-10-CM

## 2025-06-20 ENCOUNTER — HOSPITAL ENCOUNTER (EMERGENCY)
Facility: HOSPITAL | Age: 80
Discharge: HOME | End: 2025-06-20
Attending: EMERGENCY MEDICINE
Payer: MEDICARE

## 2025-06-20 ENCOUNTER — APPOINTMENT (EMERGENCY)
Dept: CT IMAGING | Facility: HOSPITAL | Age: 80
End: 2025-06-20
Payer: MEDICARE

## 2025-06-20 VITALS
BODY MASS INDEX: 41.47 KG/M2 | OXYGEN SATURATION: 94 % | SYSTOLIC BLOOD PRESSURE: 135 MMHG | RESPIRATION RATE: 21 BRPM | WEIGHT: 205.69 LBS | HEART RATE: 73 BPM | HEIGHT: 59 IN | DIASTOLIC BLOOD PRESSURE: 71 MMHG | TEMPERATURE: 99.2 F

## 2025-06-20 DIAGNOSIS — N39.0 UTI (URINARY TRACT INFECTION): Primary | ICD-10-CM

## 2025-06-20 LAB
ALBUMIN SERPL BCG-MCNC: 3.8 G/DL (ref 3.5–5)
ALP SERPL-CCNC: 89 U/L (ref 34–104)
ALT SERPL W P-5'-P-CCNC: 16 U/L (ref 7–52)
ANION GAP SERPL CALCULATED.3IONS-SCNC: 7 MMOL/L (ref 4–13)
APTT PPP: 26 SECONDS (ref 23–34)
AST SERPL W P-5'-P-CCNC: 15 U/L (ref 13–39)
BACTERIA UR QL AUTO: ABNORMAL /HPF
BASOPHILS # BLD AUTO: 0.03 THOUSANDS/ÂΜL (ref 0–0.1)
BASOPHILS NFR BLD AUTO: 0 % (ref 0–1)
BILIRUB SERPL-MCNC: 0.9 MG/DL (ref 0.2–1)
BILIRUB UR QL STRIP: ABNORMAL
BUN SERPL-MCNC: 18 MG/DL (ref 5–25)
CALCIUM SERPL-MCNC: 9.3 MG/DL (ref 8.4–10.2)
CARDIAC TROPONIN I PNL SERPL HS: 5 NG/L (ref ?–50)
CHLORIDE SERPL-SCNC: 104 MMOL/L (ref 96–108)
CLARITY UR: ABNORMAL
CO2 SERPL-SCNC: 26 MMOL/L (ref 21–32)
COLOR UR: YELLOW
CREAT SERPL-MCNC: 0.65 MG/DL (ref 0.6–1.3)
EOSINOPHIL # BLD AUTO: 0.01 THOUSAND/ÂΜL (ref 0–0.61)
EOSINOPHIL NFR BLD AUTO: 0 % (ref 0–6)
ERYTHROCYTE [DISTWIDTH] IN BLOOD BY AUTOMATED COUNT: 14.6 % (ref 11.6–15.1)
FLUAV AG UPPER RESP QL IA.RAPID: NEGATIVE
FLUBV AG UPPER RESP QL IA.RAPID: NEGATIVE
GFR SERPL CREATININE-BSD FRML MDRD: 85 ML/MIN/1.73SQ M
GLUCOSE SERPL-MCNC: 95 MG/DL (ref 65–140)
GLUCOSE UR STRIP-MCNC: NEGATIVE MG/DL
HCT VFR BLD AUTO: 39.3 % (ref 34.8–46.1)
HGB BLD-MCNC: 12.6 G/DL (ref 11.5–15.4)
HGB UR QL STRIP.AUTO: ABNORMAL
IMM GRANULOCYTES # BLD AUTO: 0.06 THOUSAND/UL (ref 0–0.2)
IMM GRANULOCYTES NFR BLD AUTO: 1 % (ref 0–2)
INR PPP: 1.03 (ref 0.85–1.19)
KETONES UR STRIP-MCNC: NEGATIVE MG/DL
LACTATE SERPL-SCNC: 0.9 MMOL/L (ref 0.5–2)
LEUKOCYTE ESTERASE UR QL STRIP: ABNORMAL
LIPASE SERPL-CCNC: 13 U/L (ref 11–82)
LYMPHOCYTES # BLD AUTO: 0.69 THOUSANDS/ÂΜL (ref 0.6–4.47)
LYMPHOCYTES NFR BLD AUTO: 6 % (ref 14–44)
MAGNESIUM SERPL-MCNC: 2 MG/DL (ref 1.9–2.7)
MCH RBC QN AUTO: 29.1 PG (ref 26.8–34.3)
MCHC RBC AUTO-ENTMCNC: 32.1 G/DL (ref 31.4–37.4)
MCV RBC AUTO: 91 FL (ref 82–98)
MONOCYTES # BLD AUTO: 0.69 THOUSAND/ÂΜL (ref 0.17–1.22)
MONOCYTES NFR BLD AUTO: 6 % (ref 4–12)
NEUTROPHILS # BLD AUTO: 9.54 THOUSANDS/ÂΜL (ref 1.85–7.62)
NEUTS SEG NFR BLD AUTO: 87 % (ref 43–75)
NITRITE UR QL STRIP: POSITIVE
NON-SQ EPI CELLS URNS QL MICRO: ABNORMAL /HPF
NRBC BLD AUTO-RTO: 0 /100 WBCS
PH UR STRIP.AUTO: 6.5 [PH]
PLATELET # BLD AUTO: 154 THOUSANDS/UL (ref 149–390)
PMV BLD AUTO: 11.8 FL (ref 8.9–12.7)
POTASSIUM SERPL-SCNC: 3.6 MMOL/L (ref 3.5–5.3)
PROT SERPL-MCNC: 6.5 G/DL (ref 6.4–8.4)
PROT UR STRIP-MCNC: ABNORMAL MG/DL
PROTHROMBIN TIME: 14 SECONDS (ref 12.3–15)
RBC # BLD AUTO: 4.33 MILLION/UL (ref 3.81–5.12)
RBC #/AREA URNS AUTO: ABNORMAL /HPF
SARS-COV+SARS-COV-2 AG RESP QL IA.RAPID: NEGATIVE
SODIUM SERPL-SCNC: 137 MMOL/L (ref 135–147)
SP GR UR STRIP.AUTO: 1.01 (ref 1–1.03)
UROBILINOGEN UR QL STRIP.AUTO: 0.2 E.U./DL
WBC # BLD AUTO: 11.02 THOUSAND/UL (ref 4.31–10.16)
WBC #/AREA URNS AUTO: ABNORMAL /HPF

## 2025-06-20 PROCEDURE — 81001 URINALYSIS AUTO W/SCOPE: CPT | Performed by: EMERGENCY MEDICINE

## 2025-06-20 PROCEDURE — 99285 EMERGENCY DEPT VISIT HI MDM: CPT | Performed by: EMERGENCY MEDICINE

## 2025-06-20 PROCEDURE — 96375 TX/PRO/DX INJ NEW DRUG ADDON: CPT

## 2025-06-20 PROCEDURE — 96361 HYDRATE IV INFUSION ADD-ON: CPT

## 2025-06-20 PROCEDURE — 71250 CT THORAX DX C-: CPT

## 2025-06-20 PROCEDURE — 83605 ASSAY OF LACTIC ACID: CPT | Performed by: EMERGENCY MEDICINE

## 2025-06-20 PROCEDURE — 85730 THROMBOPLASTIN TIME PARTIAL: CPT | Performed by: EMERGENCY MEDICINE

## 2025-06-20 PROCEDURE — 84484 ASSAY OF TROPONIN QUANT: CPT | Performed by: EMERGENCY MEDICINE

## 2025-06-20 PROCEDURE — 87811 SARS-COV-2 COVID19 W/OPTIC: CPT | Performed by: EMERGENCY MEDICINE

## 2025-06-20 PROCEDURE — 99284 EMERGENCY DEPT VISIT MOD MDM: CPT

## 2025-06-20 PROCEDURE — 93005 ELECTROCARDIOGRAM TRACING: CPT

## 2025-06-20 PROCEDURE — 87804 INFLUENZA ASSAY W/OPTIC: CPT | Performed by: EMERGENCY MEDICINE

## 2025-06-20 PROCEDURE — 87186 SC STD MICRODIL/AGAR DIL: CPT | Performed by: EMERGENCY MEDICINE

## 2025-06-20 PROCEDURE — 36415 COLL VENOUS BLD VENIPUNCTURE: CPT | Performed by: EMERGENCY MEDICINE

## 2025-06-20 PROCEDURE — 96365 THER/PROPH/DIAG IV INF INIT: CPT

## 2025-06-20 PROCEDURE — 83690 ASSAY OF LIPASE: CPT | Performed by: EMERGENCY MEDICINE

## 2025-06-20 PROCEDURE — 85025 COMPLETE CBC W/AUTO DIFF WBC: CPT | Performed by: EMERGENCY MEDICINE

## 2025-06-20 PROCEDURE — 85610 PROTHROMBIN TIME: CPT | Performed by: EMERGENCY MEDICINE

## 2025-06-20 PROCEDURE — 83735 ASSAY OF MAGNESIUM: CPT | Performed by: EMERGENCY MEDICINE

## 2025-06-20 PROCEDURE — 87086 URINE CULTURE/COLONY COUNT: CPT | Performed by: EMERGENCY MEDICINE

## 2025-06-20 PROCEDURE — 70450 CT HEAD/BRAIN W/O DYE: CPT

## 2025-06-20 PROCEDURE — 74176 CT ABD & PELVIS W/O CONTRAST: CPT

## 2025-06-20 PROCEDURE — 87077 CULTURE AEROBIC IDENTIFY: CPT | Performed by: EMERGENCY MEDICINE

## 2025-06-20 PROCEDURE — 80053 COMPREHEN METABOLIC PANEL: CPT | Performed by: EMERGENCY MEDICINE

## 2025-06-20 RX ORDER — CEPHALEXIN 500 MG/1
500 CAPSULE ORAL EVERY 6 HOURS SCHEDULED
Qty: 40 CAPSULE | Refills: 0 | Status: SHIPPED | OUTPATIENT
Start: 2025-06-20 | End: 2025-06-20 | Stop reason: CLARIF

## 2025-06-20 RX ORDER — KETOROLAC TROMETHAMINE 30 MG/ML
15 INJECTION, SOLUTION INTRAMUSCULAR; INTRAVENOUS ONCE
Status: COMPLETED | OUTPATIENT
Start: 2025-06-20 | End: 2025-06-20

## 2025-06-20 RX ORDER — CEFUROXIME AXETIL 500 MG/1
500 TABLET ORAL EVERY 12 HOURS SCHEDULED
Qty: 20 TABLET | Refills: 0 | Status: SHIPPED | OUTPATIENT
Start: 2025-06-20 | End: 2025-06-30

## 2025-06-20 RX ORDER — CEFTRIAXONE 1 G/50ML
1000 INJECTION, SOLUTION INTRAVENOUS ONCE
Status: COMPLETED | OUTPATIENT
Start: 2025-06-20 | End: 2025-06-20

## 2025-06-20 RX ORDER — ONDANSETRON 2 MG/ML
4 INJECTION INTRAMUSCULAR; INTRAVENOUS ONCE
Status: COMPLETED | OUTPATIENT
Start: 2025-06-20 | End: 2025-06-20

## 2025-06-20 RX ADMIN — CEFTRIAXONE 1000 MG: 1 INJECTION, SOLUTION INTRAVENOUS at 17:04

## 2025-06-20 RX ADMIN — ONDANSETRON 4 MG: 2 INJECTION INTRAMUSCULAR; INTRAVENOUS at 15:30

## 2025-06-20 RX ADMIN — KETOROLAC TROMETHAMINE 15 MG: 30 INJECTION, SOLUTION INTRAMUSCULAR; INTRAVENOUS at 15:32

## 2025-06-20 RX ADMIN — SODIUM CHLORIDE 1000 ML: 0.9 INJECTION, SOLUTION INTRAVENOUS at 15:33

## 2025-06-20 NOTE — ED PROVIDER NOTES
Time reflects when diagnosis was documented in both MDM as applicable and the Disposition within this note       Time User Action Codes Description Comment    6/20/2025  4:49 PM Mil Hopkins Add [N39.0] UTI (urinary tract infection)           ED Disposition       ED Disposition   Discharge    Condition   Stable    Date/Time   Fri Jun 20, 2025  5:41 PM    Comment   Hina Gutierrez discharge to home/self care.                   Assessment & Plan       Medical Decision Making  Amount and/or Complexity of Data Reviewed  Labs: ordered. Decision-making details documented in ED Course.  Radiology: ordered. Decision-making details documented in ED Course.  ECG/medicine tests: ordered and independent interpretation performed. Decision-making details documented in ED Course.  Discussion of management or test interpretation with external provider(s): At risk for but not limited to infection versus viral infection versus bacterial infection versus MONICA versus STEMI versus NSTEMI versus COVID versus flu    Risk  Prescription drug management.        ED Course as of 06/20/25 1743   Fri Jun 20, 2025   1739 CT results noted.  No signs of infection jeb to the mons pubis or right axilla.       Medications   sodium chloride 0.9 % bolus 1,000 mL (1,000 mL Intravenous New Bag 6/20/25 1533)   ketorolac (TORADOL) injection 15 mg (15 mg Intravenous Given 6/20/25 1532)   ondansetron (ZOFRAN) injection 4 mg (4 mg Intravenous Given 6/20/25 1530)   cefTRIAXone (ROCEPHIN) IVPB (premix in dextrose) 1,000 mg 50 mL (1,000 mg Intravenous New Bag 6/20/25 1704)       ED Risk Strat Scores                    No data recorded                            History of Present Illness       Chief Complaint   Patient presents with    Headache     Pt presented to this ED c/o headache w/dry heaves and body aches since yesterday. Denies travel/sob/fevers/cough/v/d       Past Medical History[1]   Past Surgical History[2]   Family History[3]   Social  History[4]   E-Cigarette/Vaping    E-Cigarette Use Never User       E-Cigarette/Vaping Substances    Nicotine No     THC No     CBD No     Flavoring No     Other No     Unknown No       I have reviewed and agree with the history as documented.     Patient complains of not feeling well since yesterday.  Has nausea.  Had dry heaves.  No diarrhea.  Last bowel was yesterday normal.  No fevers or chills.  Feels achy all over.  Complains of a frontal headache.  No trauma.  No blood thinners.  Naprosyn without relief.  No dysuria or frequency.  No recent travel.  No sick contacts.      History provided by:  Patient  Headache  Pain location:  Frontal  Quality:  Dull  Radiates to:  Does not radiate  Onset quality:  Gradual  Duration:  1 day  Timing:  Constant  Progression:  Unchanged  Chronicity:  New  Similar to prior headaches: no    Context: not activity, not stress and not loud noise    Relieved by:  Nothing  Worsened by:  Nothing  Ineffective treatments:  NSAIDs  Associated symptoms: abdominal pain, fatigue and myalgias    Associated symptoms: no blurred vision, no congestion, no cough, no diarrhea, no dizziness, no drainage, no ear pain, no eye pain, no fever, no hearing loss, no nausea, no neck pain, no neck stiffness, no numbness, no photophobia, no seizures, no sore throat, no swollen glands, no visual change and no vomiting        Review of Systems   Constitutional:  Positive for fatigue. Negative for chills and fever.   HENT:  Negative for congestion, ear pain, hearing loss, postnasal drip, sore throat, trouble swallowing and voice change.    Eyes:  Negative for blurred vision, photophobia, pain and discharge.   Respiratory:  Negative for cough, shortness of breath and wheezing.    Cardiovascular:  Negative for chest pain and palpitations.   Gastrointestinal:  Positive for abdominal pain. Negative for blood in stool, constipation, diarrhea, nausea and vomiting.   Genitourinary:  Negative for dysuria, flank pain,  frequency and hematuria.   Musculoskeletal:  Positive for myalgias. Negative for joint swelling, neck pain and neck stiffness.   Skin:  Negative for rash and wound.   Neurological:  Positive for headaches. Negative for dizziness, seizures, syncope, facial asymmetry and numbness.   Psychiatric/Behavioral:  Negative for hallucinations, self-injury and suicidal ideas.    All other systems reviewed and are negative.          Objective       ED Triage Vitals [06/20/25 1523]   Temperature Pulse Blood Pressure Respirations SpO2 Patient Position - Orthostatic VS   99.2 °F (37.3 °C) 68 141/65 18 96 % Lying      Temp Source Heart Rate Source BP Location FiO2 (%) Pain Score    Oral Monitor Right arm -- 6      Vitals      Date and Time Temp Pulse SpO2 Resp BP Pain Score FACES Pain Rating User   06/20/25 1532 -- -- -- -- -- 6 -- AC   06/20/25 1523 99.2 °F (37.3 °C) 68 96 % 18 141/65 6 -- AC            Physical Exam  Vitals and nursing note reviewed.   Constitutional:       General: She is not in acute distress.     Appearance: She is well-developed.   HENT:      Head: Normocephalic and atraumatic.      Right Ear: External ear normal.      Left Ear: External ear normal.     Eyes:      General: No scleral icterus.        Right eye: No discharge.         Left eye: No discharge.      Extraocular Movements: Extraocular movements intact.      Conjunctiva/sclera: Conjunctivae normal.       Cardiovascular:      Rate and Rhythm: Normal rate and regular rhythm.      Heart sounds: Normal heart sounds. No murmur heard.  Pulmonary:      Effort: Pulmonary effort is normal.      Breath sounds: Normal breath sounds. No wheezing or rales.   Abdominal:      General: Bowel sounds are normal. There is no distension.      Palpations: Abdomen is soft.      Tenderness: There is abdominal tenderness. There is no guarding or rebound.      Comments: Minimal diffuse tenderness.     Musculoskeletal:         General: No deformity. Normal range of motion.       Cervical back: Normal range of motion and neck supple.     Skin:     General: Skin is warm and dry.      Findings: No rash.     Neurological:      General: No focal deficit present.      Mental Status: She is alert and oriented to person, place, and time.      Cranial Nerves: No cranial nerve deficit.     Psychiatric:         Mood and Affect: Mood normal.         Behavior: Behavior normal.         Thought Content: Thought content normal.         Judgment: Judgment normal.         Results Reviewed       Procedure Component Value Units Date/Time    Urine Microscopic [[id number]]  (Abnormal) Collected: 06/20/25 1641    Lab Status: Final result Specimen: Urine, Clean Catch Updated: 06/20/25 1656     RBC, UA 10-20 /hpf      WBC, UA 20-30 /hpf      Epithelial Cells Occasional /hpf      Bacteria, UA Innumerable /hpf     Urine culture [[id number]] Collected: 06/20/25 1641    Lab Status: In process Specimen: Urine, Clean Catch Updated: 06/20/25 1656    UA w Reflex to Microscopic w Reflex to Culture [[id number]]  (Abnormal) Collected: 06/20/25 1641    Lab Status: Final result Specimen: Urine, Clean Catch Updated: 06/20/25 1647     Color, UA Yellow     Clarity, UA Slightly Cloudy     Specific Gravity, UA 1.010     pH, UA 6.5     Leukocytes, UA Small     Nitrite, UA Positive     Protein, UA Trace mg/dl      Glucose, UA Negative mg/dl      Ketones, UA Negative mg/dl      Urobilinogen, UA 0.2 E.U./dl      Bilirubin, UA Small     Occult Blood, UA Small    HS Troponin 0hr (reflex protocol) [[id number]]  (Normal) Collected: 06/20/25 1530    Lab Status: Final result Specimen: Blood from Arm, Left Updated: 06/20/25 1603     hs TnI 0hr 5 ng/L     Lipase [[id number]]  (Normal) Collected: 06/20/25 1530    Lab Status: Final result Specimen: Blood from Arm, Left Updated: 06/20/25 1557     Lipase 13 u/L     Comprehensive metabolic panel [[id number]] Collected: 06/20/25 1530    Lab Status: Final result Specimen: Blood from Arm, Left Updated:  06/20/25 1557     Sodium 137 mmol/L      Potassium 3.6 mmol/L      Chloride 104 mmol/L      CO2 26 mmol/L      ANION GAP 7 mmol/L      BUN 18 mg/dL      Creatinine 0.65 mg/dL      Glucose 95 mg/dL      Calcium 9.3 mg/dL      AST 15 U/L      ALT 16 U/L      Alkaline Phosphatase 89 U/L      Total Protein 6.5 g/dL      Albumin 3.8 g/dL      Total Bilirubin 0.90 mg/dL      eGFR 85 ml/min/1.73sq m     Narrative:      National Kidney Disease Foundation guidelines for Chronic Kidney Disease (CKD):     Stage 1 with normal or high GFR (GFR > 90 mL/min/1.73 square meters)    Stage 2 Mild CKD (GFR = 60-89 mL/min/1.73 square meters)    Stage 3A Moderate CKD (GFR = 45-59 mL/min/1.73 square meters)    Stage 3B Moderate CKD (GFR = 30-44 mL/min/1.73 square meters)    Stage 4 Severe CKD (GFR = 15-29 mL/min/1.73 square meters)    Stage 5 End Stage CKD (GFR <15 mL/min/1.73 square meters)  Note: GFR calculation is accurate only with a steady state creatinine    Magnesium [[id number]]  (Normal) Collected: 06/20/25 1530    Lab Status: Final result Specimen: Blood from Arm, Left Updated: 06/20/25 1557     Magnesium 2.0 mg/dL     FLU/COVID Rapid Antigen (30 min. TAT) - Preferred screening test in ED [[id number]]  (Normal) Collected: 06/20/25 1530    Lab Status: Final result Specimen: Nares from Nose Updated: 06/20/25 1555     SARS COV Rapid Antigen Negative     Influenza A Rapid Antigen Negative     Influenza B Rapid Antigen Negative    Narrative:      This test has been performed using the Quidel Deann 2 FLU+SARS Antigen test under the Emergency Use Authorization (EUA). This test has been validated by the  and verified by the performing laboratory. The Deann uses lateral flow immunofluorescent sandwich assay to detect SARS-COV, Influenza A and Influenza B Antigen.     The Quidel Deann 2 SARS Antigen test does not differentiate between SARS-CoV and SARS-CoV-2.     Negative results are presumptive and may be confirmed with a  molecular assay, if necessary, for patient management. Negative results do not rule out SARS-CoV-2 or influenza infection and should not be used as the sole basis for treatment or patient management decisions. A negative test result may occur if the level of antigen in a sample is below the limit of detection of this test.     Positive results are indicative of the presence of viral antigens, but do not rule out bacterial infection or co-infection with other viruses.     All test results should be used as an adjunct to clinical observations and other information available to the provider.    FOR PEDIATRIC PATIENTS - copy/paste COVID Guidelines URL to browser: [email or web address]    Lactic acid, plasma (w/reflex if result > 2.0) [[id number]]  (Normal) Collected: 06/20/25 1530    Lab Status: Final result Specimen: Blood from Arm, Left Updated: 06/20/25 1555     LACTIC ACID 0.9 mmol/L     Narrative:      Result may be elevated if tourniquet was used during collection.    Protime-INR [[id number]]  (Normal) Collected: 06/20/25 1530    Lab Status: Final result Specimen: Blood from Arm, Left Updated: 06/20/25 1551     Protime 14.0 seconds      INR 1.03    Narrative:      INR Therapeutic Range    Indication                                             INR Range      Atrial Fibrillation                                               2.0-3.0  Hypercoagulable State                                    2.0.2.3  Left Ventricular Asist Device                            2.0-3.0  Mechanical Heart Valve                                  -    Aortic(with afib, MI, embolism, HF, LA enlargement,    and/or coagulopathy)                                     2.0-3.0 (2.5-3.5)     Mitral                                                             2.5-3.5  Prosthetic/Bioprosthetic Heart Valve               2.0-3.0  Venous thromboembolism (VTE: VT, PE        2.0-3.0    APTT [[id number]]  (Normal)  Collected: 06/20/25 1530    Lab Status: Final result Specimen: Blood from Arm, Left Updated: 06/20/25 1551     PTT 26 seconds     CBC and differential [[id number]]  (Abnormal) Collected: 06/20/25 1530    Lab Status: Final result Specimen: Blood from Arm, Left Updated: 06/20/25 1538     WBC 11.02 Thousand/uL      RBC 4.33 Million/uL      Hemoglobin 12.6 g/dL      Hematocrit 39.3 %      MCV 91 fL      MCH 29.1 pg      MCHC 32.1 g/dL      RDW 14.6 %      MPV 11.8 fL      Platelets 154 Thousands/uL      nRBC 0 /100 WBCs      Segmented % 87 %      Immature Grans % 1 %      Lymphocytes % 6 %      Monocytes % 6 %      Eosinophils Relative 0 %      Basophils Relative 0 %      Absolute Neutrophils 9.54 Thousands/µL      Absolute Immature Grans 0.06 Thousand/uL      Absolute Lymphocytes 0.69 Thousands/µL      Absolute Monocytes 0.69 Thousand/µL      Eosinophils Absolute 0.01 Thousand/µL      Basophils Absolute 0.03 Thousands/µL             CT head without contrast   Final Interpretation by Felipe Hodge MD (06/20 5021)      1.  No acute intracranial abnormality.   2.  Somewhat prominent appearance of the pituitary gland is nonspecific, but could be secondary to an underlying pituitary adenoma. Recommend further evaluation with a nonemergent pituitary protocol contrast-enhanced brain MRI.                  Workstation performed: [id number]         CT chest abdomen pelvis wo contrast   Final Interpretation by Felipe Hodge MD (06/20 5442)      1.  No acute findings in the chest, abdomen or pelvis within the limits of unenhanced technique.   2.  Mild skin thickening along the right axillary crease and lower abdominal wall/mons pubis. Correlate clinically for signs of cellulitis.   3.  1.0 cm right breast nodule. Correlate with mammography.         Workstation performed: [id number]             ECG 12 Lead Documentation Only    Date/Time: 6/20/2025 4:05 PM    Performed by: Mil Hopkins MD  Authorized by: Mil Hopkins MD     ECG reviewed by me, the ED Provider: yes    Patient location:  ED  Previous ECG:     Previous ECG:  Unavailable  Rate:     ECG rate:  80  Rhythm:     Rhythm: sinus rhythm    Ectopy:     Ectopy: none    QRS:     QRS axis:  Normal  Comments:      Right bundle branch block noted.      ED Medication and Procedure Management   Prior to Admission Medications   Prescriptions Last Dose Informant Patient Reported? Taking?   Azelaic Acid 15 % cream   Yes No   Sig: Apply 1 Application topically in the morning   Cholecalciferol (VITAMIN D3) 50 MCG (2000 UT) TABS   Yes No   Sig: Take by mouth every other day    FLUoxetine (PROzac) 10 mg capsule   Yes No   Sig: Take 10 mg by mouth daily   ciclopirox (PENLAC) 8 % solution   No No   Sig: Apply topically daily at bedtime File nails in thickness weekly   ketoconazole (NIZORAL) 2 % cream   Yes No   Sig: Apply 1 Application topically if needed for itching   Patient not taking: Reported on 5/9/2025   levothyroxine (Synthroid) 75 mcg tablet   Yes No   Sig: Take by mouth daily in the early morning    simvastatin (ZOCOR) 40 mg tablet   Yes No   Sig: Take by mouth daily at bedtime    traMADol (ULTRAM) 50 mg tablet   Yes No   Sig: Take 50 mg by mouth 2 (two) times a day   vitamin B-12 (VITAMIN B-12) 1,000 mcg tablet   Yes No   Sig: Take by mouth daily      Facility-Administered Medications: None     Patient's Medications   Discharge Prescriptions    CEFUROXIME (CEFTIN) 500 MG TABLET    Take 1 tablet (500 mg total) by mouth every 12 (twelve) hours for 10 days       Start Date: 6/20/2025 End Date: 6/30/2025       Order Dose: 500 mg       Quantity: 20 tablet    Refills: 0     No discharge procedures on file.  ED SEPSIS DOCUMENTATION   Time reflects when diagnosis was documented in both MDM as applicable and the Disposition within this note       Time User Action Codes Description Comment    6/20/2025  4:49 PM Mil Hopkins Add [N39.0] UTI (urinary tract infection)                      [1]    Past Medical History:  Diagnosis Date    Anxiety     BRCA1 negative     BRCA2 negative     Disease of thyroid gland     Hypercholesterolemia     Rosacea    [2]   Past Surgical History:  Procedure Laterality Date    BACK SURGERY      HYSTERECTOMY  1999   [3]   Family History  Problem Relation Name Age of Onset    Heart disease Mother      Heart disease Father      No Known Problems Maternal Grandmother      No Known Problems Maternal Grandfather      Breast cancer Paternal Grandmother      No Known Problems Paternal Grandfather      No Known Problems Brother      No Known Problems Maternal Aunt      No Known Problems Maternal Aunt     [4]   Social History  Tobacco Use    Smoking status: Never    Smokeless tobacco: Never   Vaping Use    Vaping status: Never Used   Substance Use Topics    Alcohol use: Yes     Comment: occasionally    Drug use: Not Currently        Mil Hopkins MD  06/20/25 0858

## 2025-06-20 NOTE — DISCHARGE INSTRUCTIONS
Patient Education     Urinary Tract Infection, Adult ED   General Information   You came to the Emergency Department (ED) for a urinary tract infection or UTI. Most UTIs are infections in either your bladder or your kidneys. Bladder infections are more common and may also be called cystitis. Kidney infections are more serious and may also be called pyelonephritis. You need antibiotics to treat a UTI. It is important to take all of your antibiotics even if you start to feel better.  What care is needed at home?   Call your regular doctor to let them know you were in the ED. Make a follow-up appointment if you were told to.  For the first day or so, you may want to take an over-the-counter medicine, like phenazopyridine. This will help to numb your bladder. You will also not have the strong urge to urinate. This medicine causes your urine and tears to look orange. If you have kidney disease, talk to your doctor before taking this medicine.  To lower your chance of getting a UTI in the future, you can:  Drink extra fluids.  If you have sex, urinate right afterwards.  When do I need to get emergency help?   Return to the ED if:   You have very bad pain in your back, shoulder, or belly.  You have a fever of 102.2°F (39°C); shaking chills or sweats even though you are taking antibiotics.  When do I need to call the doctor?   You have a fever up to 100.4°F (38°C).  You notice more blood in your urine.  Your signs get worse or do not improve within 24 hours of starting treatment.  You are not able to urinate for more than 8 hours  Your signs come back after treatment has stopped.  You have new or worsening symptoms.  Last Reviewed Date   2021-03-12  Consumer Information Use and Disclaimer   This generalized information is a limited summary of diagnosis, treatment, and/or medication information. It is not meant to be comprehensive and should be used as a tool to help the user understand and/or assess potential diagnostic and  treatment options. It does NOT include all information about conditions, treatments, medications, side effects, or risks that may apply to a specific patient. It is not intended to be medical advice or a substitute for the medical advice, diagnosis, or treatment of a health care provider based on the health care provider's examination and assessment of a patient’s specific and unique circumstances. Patients must speak with a health care provider for complete information about their health, medical questions, and treatment options, including any risks or benefits regarding use of medications. This information does not endorse any treatments or medications as safe, effective, or approved for treating a specific patient. [employer] and its affiliates disclaim any warranty or liability relating to this information or the use thereof. The use of this information is governed by the Terms of Use, available at https://www.[employer].com/en/know/clinical-effectiveness-terms   Copyright   Copyright © 2024 [employer], [employer] and its affiliates and/or licensors. All rights reserved.

## 2025-06-21 ENCOUNTER — RESULTS FOLLOW-UP (OUTPATIENT)
Dept: EMERGENCY DEPT | Facility: HOSPITAL | Age: 80
End: 2025-06-21

## 2025-06-22 LAB — BACTERIA UR CULT: ABNORMAL

## 2025-06-23 LAB
ATRIAL RATE: 77 BPM
P AXIS: 20 DEGREES
PR INTERVAL: 174 MS
QRS AXIS: 16 DEGREES
QRSD INTERVAL: 136 MS
QT INTERVAL: 400 MS
QTC INTERVAL: 452 MS
T WAVE AXIS: 19 DEGREES
VENTRICULAR RATE: 77 BPM

## 2025-07-18 RX ORDER — MELOXICAM 15 MG/1
15 TABLET ORAL DAILY
COMMUNITY
Start: 2025-05-06 | End: 2025-07-21 | Stop reason: ALTCHOICE

## 2025-07-18 RX ORDER — NAPROXEN SODIUM 220 MG/1
220 TABLET, FILM COATED ORAL 2 TIMES DAILY WITH MEALS
COMMUNITY

## 2025-07-18 RX ORDER — METHOCARBAMOL 750 MG/1
750 TABLET, FILM COATED ORAL
COMMUNITY
Start: 2024-12-23 | End: 2025-07-21 | Stop reason: ALTCHOICE

## 2025-07-21 ENCOUNTER — PROCEDURE VISIT (OUTPATIENT)
Dept: PODIATRY | Age: 80
End: 2025-07-21
Payer: MEDICARE

## 2025-07-21 VITALS — HEIGHT: 59 IN | BODY MASS INDEX: 40.84 KG/M2 | WEIGHT: 202.6 LBS

## 2025-07-21 DIAGNOSIS — I73.9 PVD (PERIPHERAL VASCULAR DISEASE) (HCC): ICD-10-CM

## 2025-07-21 DIAGNOSIS — B35.1 ONYCHOMYCOSIS: Primary | ICD-10-CM

## 2025-07-21 PROCEDURE — 11721 DEBRIDE NAIL 6 OR MORE: CPT | Performed by: STUDENT IN AN ORGANIZED HEALTH CARE EDUCATION/TRAINING PROGRAM

## 2025-07-21 RX ORDER — NAPROXEN 500 MG/1
500 TABLET ORAL 2 TIMES DAILY PRN
COMMUNITY
Start: 2025-06-25

## 2025-07-21 NOTE — PROGRESS NOTES
Hina Yuritania  1945  AT RISK FOOT CARE    1. Onychomycosis        2. PVD (peripheral vascular disease) ([facility])            Patient presents for at-risk foot care.  Patient has no acute concerns today.  Patient has significant lower extremity risk due to h/o parasthesia, edema, and trophic skin changes to the lower extremity.    On exam patient has thickened, hypertrophic, discolored, brittle toenails with subungual debris and tenderness x10  Patient has lower extremity edema  PAtients skin is atrophic, thickened nails, and decreased pedal hair  Patient has decreased pinprick and vibratory sensation to his feet and parasthesia    Today's treatment includes:  Debridement of toenails. Using nail nipper, zen, and curette, nails were sharply debrided, reduced in thickness and length. Devitalized nail tissue and fungal debris excised and removed. Patient tolerated well.      C/w ciclopirox.     Discussed proper shoe gear, daily inspections of feet, and general foot health with patient. Patient has Q9  findings and is recommended for at risk foot care every 9-10 weeks.    Patients most recent complete clinical foot exam was on: 10/18/24